# Patient Record
Sex: FEMALE | Race: BLACK OR AFRICAN AMERICAN | Employment: FULL TIME | URBAN - METROPOLITAN AREA
[De-identification: names, ages, dates, MRNs, and addresses within clinical notes are randomized per-mention and may not be internally consistent; named-entity substitution may affect disease eponyms.]

---

## 2018-04-06 NOTE — PERIOP NOTES
PAT PREOP PHONE INTERVIEW COMPLETED WITH:  Michelle Manriquez, PATIENT. PATIENT VERBALIZED KNOWLEDGE OF PREOP BOWEL PREP. PATIENT ADVISED NOT TO EAT/DRINK ANYTHING PAST MIDNIGHT EVENING PRIOR TO SURGERY,  NOTHING TO EAT/DRINK MORNING OF SURGERY UNLESS SIP OF WATER TO SWALLOW MEDICATION;  LEAVE ALL VALUABLES AT HOME; DO BRING PICTURE ID, INSURANCE CARD AND ANY COPAY; WEAR COMFORTABLE CLOTHING;  NO PERFUMES, POWDERS, LOTIONS; NO ALCOHOL 24 HOURS BEFORE OR AFTER SURGERY;  WILL NEED TO BE DRIVEN HOME BY FAMILY OR FRIEND;  AVOID TAKING NSAIDS, ASPIRIN, FISH OIL, VITAMIN E OR GLUCOSAMINE/CHONDROITIN DURING THIS TIME PRIOR TO SURGERY;  MAY TAKE TYLENOL. INSTRUCTED TO REPORT  [address] BY SURGEON'S OFFICE.

## 2018-04-10 ENCOUNTER — ANESTHESIA EVENT (OUTPATIENT)
Dept: SURGERY | Age: 38
End: 2018-04-10
Payer: COMMERCIAL

## 2018-04-10 ENCOUNTER — ANESTHESIA (OUTPATIENT)
Dept: SURGERY | Age: 38
End: 2018-04-10
Payer: COMMERCIAL

## 2018-04-10 ENCOUNTER — HOSPITAL ENCOUNTER (OUTPATIENT)
Age: 38
Setting detail: OUTPATIENT SURGERY
Discharge: HOME | End: 2018-04-10
Attending: SPECIALIST | Admitting: SPECIALIST
Payer: COMMERCIAL

## 2018-04-10 VITALS
DIASTOLIC BLOOD PRESSURE: 77 MMHG | HEART RATE: 68 BPM | BODY MASS INDEX: 33.66 KG/M2 | OXYGEN SATURATION: 96 % | HEIGHT: 63 IN | RESPIRATION RATE: 14 BRPM | TEMPERATURE: 97.8 F | SYSTOLIC BLOOD PRESSURE: 113 MMHG | WEIGHT: 190 LBS

## 2018-04-10 DIAGNOSIS — N80.9 ENDOMETRIOSIS: Primary | ICD-10-CM

## 2018-04-10 LAB
HCG UR QL: NEGATIVE
HGB BLD-MCNC: 12 G/DL (ref 11.5–16)

## 2018-04-10 PROCEDURE — 77030018316 HC SEAL FBRN TISSL 4ML BAXT -C: Performed by: SPECIALIST

## 2018-04-10 PROCEDURE — 77030008756 HC TU IRR SUC STRY -B: Performed by: SPECIALIST

## 2018-04-10 PROCEDURE — 77030018836 HC SOL IRR NACL ICUM -A: Performed by: SPECIALIST

## 2018-04-10 PROCEDURE — 76060000032 HC ANESTHESIA 0.5 TO 1 HR: Performed by: SPECIALIST

## 2018-04-10 PROCEDURE — 77030008684 HC TU ET CUF COVD -B: Performed by: ANESTHESIOLOGY

## 2018-04-10 PROCEDURE — 74011250637 HC RX REV CODE- 250/637: Performed by: ANESTHESIOLOGY

## 2018-04-10 PROCEDURE — 81025 URINE PREGNANCY TEST: CPT

## 2018-04-10 PROCEDURE — 77030010507 HC ADH SKN DERMBND J&J -B: Performed by: SPECIALIST

## 2018-04-10 PROCEDURE — 77030009852 HC PCH RTVR ENDOSC COVD -B: Performed by: SPECIALIST

## 2018-04-10 PROCEDURE — 77030032490 HC SLV COMPR SCD KNE COVD -B: Performed by: SPECIALIST

## 2018-04-10 PROCEDURE — 77030005518 HC CATH URETH FOL 2W BARD -B: Performed by: SPECIALIST

## 2018-04-10 PROCEDURE — 74011000250 HC RX REV CODE- 250: Performed by: SPECIALIST

## 2018-04-10 PROCEDURE — 77030027743 HC APPL F/HEMSTAT BARD -B: Performed by: SPECIALIST

## 2018-04-10 PROCEDURE — 88305 TISSUE EXAM BY PATHOLOGIST: CPT | Performed by: SPECIALIST

## 2018-04-10 PROCEDURE — 76010000933 HC OR TIME 0.5 TO 1HR INTENSV - TIER 2: Performed by: SPECIALIST

## 2018-04-10 PROCEDURE — 77030020703 HC SEAL CANN DISP INTU -B: Performed by: SPECIALIST

## 2018-04-10 PROCEDURE — 77030026438 HC STYL ET INTUB CARD -A: Performed by: ANESTHESIOLOGY

## 2018-04-10 PROCEDURE — 74011250636 HC RX REV CODE- 250/636

## 2018-04-10 PROCEDURE — 74011250636 HC RX REV CODE- 250/636: Performed by: SPECIALIST

## 2018-04-10 PROCEDURE — 77030016151 HC PROTCTR LNS DFOG COVD -B: Performed by: SPECIALIST

## 2018-04-10 PROCEDURE — 77030013079 HC BLNKT BAIR HGGR 3M -A: Performed by: ANESTHESIOLOGY

## 2018-04-10 PROCEDURE — 85018 HEMOGLOBIN: CPT

## 2018-04-10 PROCEDURE — 77030035277 HC OBTRTR BLDELSS DISP INTU -B: Performed by: SPECIALIST

## 2018-04-10 PROCEDURE — 77030035045 HC TRCR ENDOSC VRSPRT BLDLSS COVD -B: Performed by: SPECIALIST

## 2018-04-10 PROCEDURE — 77030031139 HC SUT VCRL2 J&J -A: Performed by: SPECIALIST

## 2018-04-10 PROCEDURE — 74011250636 HC RX REV CODE- 250/636: Performed by: ANESTHESIOLOGY

## 2018-04-10 PROCEDURE — 77030035048 HC TRCR ENDOSC OPTCL COVD -B: Performed by: SPECIALIST

## 2018-04-10 PROCEDURE — 77030002933 HC SUT MCRYL J&J -A: Performed by: SPECIALIST

## 2018-04-10 PROCEDURE — 88307 TISSUE EXAM BY PATHOLOGIST: CPT | Performed by: SPECIALIST

## 2018-04-10 PROCEDURE — 77030032060 HC PWDR HEMSTAT ARISTA ASRB 3GM BARD -C: Performed by: SPECIALIST

## 2018-04-10 PROCEDURE — 76210000021 HC REC RM PH II 0.5 TO 1 HR: Performed by: SPECIALIST

## 2018-04-10 PROCEDURE — 76210000016 HC OR PH I REC 1 TO 1.5 HR: Performed by: SPECIALIST

## 2018-04-10 PROCEDURE — 77030035051: Performed by: SPECIALIST

## 2018-04-10 PROCEDURE — 74011000250 HC RX REV CODE- 250

## 2018-04-10 PROCEDURE — 77030020782 HC GWN BAIR PAWS FLX 3M -B

## 2018-04-10 PROCEDURE — C1765 ADHESION BARRIER: HCPCS | Performed by: SPECIALIST

## 2018-04-10 PROCEDURE — 77030003580 HC NDL INSUF VERES J&J -B: Performed by: SPECIALIST

## 2018-04-10 RX ORDER — HYDROMORPHONE HYDROCHLORIDE 2 MG/ML
INJECTION, SOLUTION INTRAMUSCULAR; INTRAVENOUS; SUBCUTANEOUS AS NEEDED
Status: DISCONTINUED | OUTPATIENT
Start: 2018-04-10 | End: 2018-04-10 | Stop reason: HOSPADM

## 2018-04-10 RX ORDER — FENTANYL CITRATE 50 UG/ML
25 INJECTION, SOLUTION INTRAMUSCULAR; INTRAVENOUS
Status: DISCONTINUED | OUTPATIENT
Start: 2018-04-10 | End: 2018-04-10 | Stop reason: HOSPADM

## 2018-04-10 RX ORDER — SODIUM CHLORIDE 9 MG/ML
50 INJECTION, SOLUTION INTRAVENOUS CONTINUOUS
Status: DISCONTINUED | OUTPATIENT
Start: 2018-04-10 | End: 2018-04-10 | Stop reason: HOSPADM

## 2018-04-10 RX ORDER — SODIUM CHLORIDE 0.9 % (FLUSH) 0.9 %
5-10 SYRINGE (ML) INJECTION AS NEEDED
Status: DISCONTINUED | OUTPATIENT
Start: 2018-04-10 | End: 2018-04-10 | Stop reason: HOSPADM

## 2018-04-10 RX ORDER — MIDAZOLAM HYDROCHLORIDE 1 MG/ML
1 INJECTION, SOLUTION INTRAMUSCULAR; INTRAVENOUS AS NEEDED
Status: DISCONTINUED | OUTPATIENT
Start: 2018-04-10 | End: 2018-04-10 | Stop reason: HOSPADM

## 2018-04-10 RX ORDER — BUPIVACAINE HYDROCHLORIDE 5 MG/ML
INJECTION, SOLUTION EPIDURAL; INTRACAUDAL AS NEEDED
Status: DISCONTINUED | OUTPATIENT
Start: 2018-04-10 | End: 2018-04-10 | Stop reason: HOSPADM

## 2018-04-10 RX ORDER — DIPHENHYDRAMINE HYDROCHLORIDE 50 MG/ML
12.5 INJECTION, SOLUTION INTRAMUSCULAR; INTRAVENOUS AS NEEDED
Status: DISCONTINUED | OUTPATIENT
Start: 2018-04-10 | End: 2018-04-10 | Stop reason: HOSPADM

## 2018-04-10 RX ORDER — GLYCOPYRROLATE 0.2 MG/ML
INJECTION INTRAMUSCULAR; INTRAVENOUS AS NEEDED
Status: DISCONTINUED | OUTPATIENT
Start: 2018-04-10 | End: 2018-04-10 | Stop reason: HOSPADM

## 2018-04-10 RX ORDER — MORPHINE SULFATE 10 MG/ML
2 INJECTION, SOLUTION INTRAMUSCULAR; INTRAVENOUS
Status: DISCONTINUED | OUTPATIENT
Start: 2018-04-10 | End: 2018-04-10 | Stop reason: HOSPADM

## 2018-04-10 RX ORDER — LIDOCAINE HYDROCHLORIDE 20 MG/ML
INJECTION, SOLUTION EPIDURAL; INFILTRATION; INTRACAUDAL; PERINEURAL AS NEEDED
Status: DISCONTINUED | OUTPATIENT
Start: 2018-04-10 | End: 2018-04-10 | Stop reason: HOSPADM

## 2018-04-10 RX ORDER — SODIUM CHLORIDE, SODIUM LACTATE, POTASSIUM CHLORIDE, CALCIUM CHLORIDE 600; 310; 30; 20 MG/100ML; MG/100ML; MG/100ML; MG/100ML
125 INJECTION, SOLUTION INTRAVENOUS CONTINUOUS
Status: DISCONTINUED | OUTPATIENT
Start: 2018-04-10 | End: 2018-04-10 | Stop reason: HOSPADM

## 2018-04-10 RX ORDER — ENOXAPARIN SODIUM 100 MG/ML
40 INJECTION SUBCUTANEOUS ONCE
Status: COMPLETED | OUTPATIENT
Start: 2018-04-10 | End: 2018-04-10

## 2018-04-10 RX ORDER — SODIUM CHLORIDE, SODIUM LACTATE, POTASSIUM CHLORIDE, CALCIUM CHLORIDE 600; 310; 30; 20 MG/100ML; MG/100ML; MG/100ML; MG/100ML
INJECTION, SOLUTION INTRAVENOUS
Status: DISCONTINUED | OUTPATIENT
Start: 2018-04-10 | End: 2018-04-10 | Stop reason: HOSPADM

## 2018-04-10 RX ORDER — DEXAMETHASONE SODIUM PHOSPHATE 4 MG/ML
INJECTION, SOLUTION INTRA-ARTICULAR; INTRALESIONAL; INTRAMUSCULAR; INTRAVENOUS; SOFT TISSUE AS NEEDED
Status: DISCONTINUED | OUTPATIENT
Start: 2018-04-10 | End: 2018-04-10 | Stop reason: HOSPADM

## 2018-04-10 RX ORDER — PROPOFOL 10 MG/ML
INJECTION, EMULSION INTRAVENOUS AS NEEDED
Status: DISCONTINUED | OUTPATIENT
Start: 2018-04-10 | End: 2018-04-10 | Stop reason: HOSPADM

## 2018-04-10 RX ORDER — OXYCODONE AND ACETAMINOPHEN 5; 325 MG/1; MG/1
1 TABLET ORAL AS NEEDED
Status: DISCONTINUED | OUTPATIENT
Start: 2018-04-10 | End: 2018-04-10 | Stop reason: HOSPADM

## 2018-04-10 RX ORDER — ONDANSETRON 2 MG/ML
INJECTION INTRAMUSCULAR; INTRAVENOUS AS NEEDED
Status: DISCONTINUED | OUTPATIENT
Start: 2018-04-10 | End: 2018-04-10 | Stop reason: HOSPADM

## 2018-04-10 RX ORDER — SODIUM CHLORIDE 0.9 % (FLUSH) 0.9 %
5-10 SYRINGE (ML) INJECTION EVERY 8 HOURS
Status: DISCONTINUED | OUTPATIENT
Start: 2018-04-10 | End: 2018-04-10 | Stop reason: HOSPADM

## 2018-04-10 RX ORDER — ONDANSETRON 2 MG/ML
4 INJECTION INTRAMUSCULAR; INTRAVENOUS AS NEEDED
Status: DISCONTINUED | OUTPATIENT
Start: 2018-04-10 | End: 2018-04-10 | Stop reason: HOSPADM

## 2018-04-10 RX ORDER — SODIUM CHLORIDE 9 MG/ML
1000 INJECTION, SOLUTION INTRAVENOUS CONTINUOUS
Status: DISCONTINUED | OUTPATIENT
Start: 2018-04-10 | End: 2018-04-10 | Stop reason: HOSPADM

## 2018-04-10 RX ORDER — LIDOCAINE HYDROCHLORIDE 10 MG/ML
0.1 INJECTION, SOLUTION EPIDURAL; INFILTRATION; INTRACAUDAL; PERINEURAL AS NEEDED
Status: DISCONTINUED | OUTPATIENT
Start: 2018-04-10 | End: 2018-04-10 | Stop reason: HOSPADM

## 2018-04-10 RX ORDER — KETOROLAC TROMETHAMINE 30 MG/ML
INJECTION, SOLUTION INTRAMUSCULAR; INTRAVENOUS AS NEEDED
Status: DISCONTINUED | OUTPATIENT
Start: 2018-04-10 | End: 2018-04-10 | Stop reason: HOSPADM

## 2018-04-10 RX ORDER — FENTANYL CITRATE 50 UG/ML
50 INJECTION, SOLUTION INTRAMUSCULAR; INTRAVENOUS AS NEEDED
Status: DISCONTINUED | OUTPATIENT
Start: 2018-04-10 | End: 2018-04-10 | Stop reason: HOSPADM

## 2018-04-10 RX ORDER — SUCCINYLCHOLINE CHLORIDE 20 MG/ML
INJECTION INTRAMUSCULAR; INTRAVENOUS AS NEEDED
Status: DISCONTINUED | OUTPATIENT
Start: 2018-04-10 | End: 2018-04-10 | Stop reason: HOSPADM

## 2018-04-10 RX ORDER — CEFAZOLIN SODIUM/WATER 2 G/20 ML
2 SYRINGE (ML) INTRAVENOUS ONCE
Status: COMPLETED | OUTPATIENT
Start: 2018-04-10 | End: 2018-04-10

## 2018-04-10 RX ORDER — HYDROCODONE BITARTRATE AND ACETAMINOPHEN 5; 325 MG/1; MG/1
1 TABLET ORAL
Qty: 30 TAB | Refills: 0 | Status: SHIPPED | OUTPATIENT
Start: 2018-04-10 | End: 2022-04-15

## 2018-04-10 RX ORDER — FENTANYL CITRATE 50 UG/ML
INJECTION, SOLUTION INTRAMUSCULAR; INTRAVENOUS AS NEEDED
Status: DISCONTINUED | OUTPATIENT
Start: 2018-04-10 | End: 2018-04-10 | Stop reason: HOSPADM

## 2018-04-10 RX ORDER — MIDAZOLAM HYDROCHLORIDE 1 MG/ML
INJECTION, SOLUTION INTRAMUSCULAR; INTRAVENOUS AS NEEDED
Status: DISCONTINUED | OUTPATIENT
Start: 2018-04-10 | End: 2018-04-10 | Stop reason: HOSPADM

## 2018-04-10 RX ORDER — NEOSTIGMINE METHYLSULFATE 1 MG/ML
INJECTION INTRAVENOUS AS NEEDED
Status: DISCONTINUED | OUTPATIENT
Start: 2018-04-10 | End: 2018-04-10 | Stop reason: HOSPADM

## 2018-04-10 RX ORDER — MIDAZOLAM HYDROCHLORIDE 1 MG/ML
0.5 INJECTION, SOLUTION INTRAMUSCULAR; INTRAVENOUS
Status: DISCONTINUED | OUTPATIENT
Start: 2018-04-10 | End: 2018-04-10 | Stop reason: HOSPADM

## 2018-04-10 RX ORDER — ROCURONIUM BROMIDE 10 MG/ML
INJECTION, SOLUTION INTRAVENOUS AS NEEDED
Status: DISCONTINUED | OUTPATIENT
Start: 2018-04-10 | End: 2018-04-10 | Stop reason: HOSPADM

## 2018-04-10 RX ADMIN — FENTANYL CITRATE 50 MCG: 50 INJECTION, SOLUTION INTRAMUSCULAR; INTRAVENOUS at 07:34

## 2018-04-10 RX ADMIN — KETOROLAC TROMETHAMINE 30 MG: 30 INJECTION, SOLUTION INTRAMUSCULAR; INTRAVENOUS at 08:40

## 2018-04-10 RX ADMIN — Medication 2 G: at 07:35

## 2018-04-10 RX ADMIN — PROPOFOL 50 MG: 10 INJECTION, EMULSION INTRAVENOUS at 07:34

## 2018-04-10 RX ADMIN — OXYCODONE HYDROCHLORIDE AND ACETAMINOPHEN 1 TABLET: 5; 325 TABLET ORAL at 10:13

## 2018-04-10 RX ADMIN — ENOXAPARIN SODIUM 40 MG: 40 INJECTION SUBCUTANEOUS at 07:16

## 2018-04-10 RX ADMIN — SODIUM CHLORIDE, SODIUM LACTATE, POTASSIUM CHLORIDE, AND CALCIUM CHLORIDE 125 ML/HR: 600; 310; 30; 20 INJECTION, SOLUTION INTRAVENOUS at 07:14

## 2018-04-10 RX ADMIN — PROPOFOL 150 MG: 10 INJECTION, EMULSION INTRAVENOUS at 07:30

## 2018-04-10 RX ADMIN — ONDANSETRON 4 MG: 2 INJECTION INTRAMUSCULAR; INTRAVENOUS at 08:39

## 2018-04-10 RX ADMIN — FENTANYL CITRATE 25 MCG: 50 INJECTION, SOLUTION INTRAMUSCULAR; INTRAVENOUS at 09:20

## 2018-04-10 RX ADMIN — ONDANSETRON 4 MG: 2 INJECTION INTRAMUSCULAR; INTRAVENOUS at 09:55

## 2018-04-10 RX ADMIN — HYDROMORPHONE HYDROCHLORIDE 0.5 MG: 2 INJECTION, SOLUTION INTRAMUSCULAR; INTRAVENOUS; SUBCUTANEOUS at 07:51

## 2018-04-10 RX ADMIN — FENTANYL CITRATE 25 MCG: 50 INJECTION, SOLUTION INTRAMUSCULAR; INTRAVENOUS at 09:47

## 2018-04-10 RX ADMIN — DEXAMETHASONE SODIUM PHOSPHATE 8 MG: 4 INJECTION, SOLUTION INTRA-ARTICULAR; INTRALESIONAL; INTRAMUSCULAR; INTRAVENOUS; SOFT TISSUE at 07:42

## 2018-04-10 RX ADMIN — NEOSTIGMINE METHYLSULFATE 3 MG: 1 INJECTION INTRAVENOUS at 08:39

## 2018-04-10 RX ADMIN — SODIUM CHLORIDE, SODIUM LACTATE, POTASSIUM CHLORIDE, CALCIUM CHLORIDE: 600; 310; 30; 20 INJECTION, SOLUTION INTRAVENOUS at 07:26

## 2018-04-10 RX ADMIN — SUCCINYLCHOLINE CHLORIDE 140 MG: 20 INJECTION INTRAMUSCULAR; INTRAVENOUS at 07:30

## 2018-04-10 RX ADMIN — FENTANYL CITRATE 50 MCG: 50 INJECTION, SOLUTION INTRAMUSCULAR; INTRAVENOUS at 07:30

## 2018-04-10 RX ADMIN — ROCURONIUM BROMIDE 5 MG: 10 INJECTION, SOLUTION INTRAVENOUS at 07:30

## 2018-04-10 RX ADMIN — ROCURONIUM BROMIDE 20 MG: 10 INJECTION, SOLUTION INTRAVENOUS at 07:39

## 2018-04-10 RX ADMIN — GLYCOPYRROLATE 0.5 MG: 0.2 INJECTION INTRAMUSCULAR; INTRAVENOUS at 08:39

## 2018-04-10 RX ADMIN — FENTANYL CITRATE 25 MCG: 50 INJECTION, SOLUTION INTRAMUSCULAR; INTRAVENOUS at 09:25

## 2018-04-10 RX ADMIN — LIDOCAINE HYDROCHLORIDE 100 MG: 20 INJECTION, SOLUTION EPIDURAL; INFILTRATION; INTRACAUDAL; PERINEURAL at 07:30

## 2018-04-10 RX ADMIN — MIDAZOLAM HYDROCHLORIDE 2 MG: 1 INJECTION, SOLUTION INTRAMUSCULAR; INTRAVENOUS at 07:26

## 2018-04-10 NOTE — OP NOTES
[address]  ACUTE CARE OP NOTE    Rmima Zuñiga  MR#: [id number]  : 1980  ACCOUNT #: [de-identified]   DATE OF SERVICE: 04/10/2018    PREOPERATIVE DIAGNOSES:  1.  Pelvic pain. 2.  Irregular menstrual cycle. 3.  Endometriosis. 4.  Ovarian cyst.    POSTOPERATIVE DIAGNOSES:  1.  Pelvic pain. 2.  Irregular menstrual cycle. 3.  Endometriosis. 4.  Ovarian cyst.    PROCEDURES PERFORMED:  1.  Da Magali diagnostic laparoscopy. 2.  Extensive lysis of adhesions. 3.  Bilateral ovarian cystectomy. SURGEON:  Yajaira Dixon MD     ASSISTANT:       ANESTHESIA:  General.    FINDINGS:    1. Left simple cyst, approximately 10 x 7 cm. 2.  Right endometrioma, approximately 3 x 4 cm. 3.  Left dermoid, approximately 4 x 5 cm. 4.  Normal bilateral fallopian tubes. 5.  Normal-sized uterus. SPECIMENS REMOVED:    1. Dermoid. 2.  Endometrial cyst wall. ESTIMATED BLOOD LOSS:  Minimal.    FLUID:  Within the simple ovarian cyst 400 mL of clear fluid. COMPLICATIONS:  None. DRAINS:  Martins catheter, clear urine. IMPLANTS:       DESCRIPTION OF PROCEDURE:  After extensive counseling of risks and benefits of the procedure, complication rates of the procedure, alternatives to procedure and consent being signed, she was taken to the operating room. After adequate anesthesia, she was placed in the dorsal lithotomy position, prepped and draped in the usual sterile manner for the surgical procedure. Martins catheter was in place, clear urine was noted. Sterile Graves speculum was inserted in the vagina. The anterior lip of the cervix was grasped with a single-tooth tenaculum, and the cervix was gently dilated to accept a ClearView uterine manipulator.   The single-tooth tenaculum and Graves speculum were removed, and attention was turned to the abdomen where a Veress needle was placed infraumbilically and confirmed with a water drop test.  Insufflation of CO2 up to 15 mmHg was then performed without complication. A #11 scalpel blade was used to make an infraumbilical incision, 8 mm bladeless trocar and sleeve was inserted infraumbilically with the above-noted findings. Then, 10 cm bilateral to the umbilical port site, 8 mm bladeless trocars were inserted, as well 1 in the right lower quadrant port site. The patient was placed in Trendelenburg, the [employer] robot was docked under the proper docking technique. Fenestrated bipolar in left hand, hot ana in the right hand. Under careful inspection with hydrodissection, extensive adhesiolysis for approximately 30 minutes was performed, with the sigmoid colon adherent to the posterior surface of the uterus. The above-noted findings were noted. A left ovarian cystectomy was performed, the fluid was suctioned from the ovarian cyst of approximately 400+ clear fluid. Careful inspection revealed a left dermoid cyst, approximately 3 x 4 cm, which left ovarian cystectomy was performed. The dermoid was placed in an Endobag and removed through the right lower quadrant port site. There was also on the right side endometrioma x2, which cystectomy was performed, irrigation was performed, the fluid was clear. In light of the patient's desire to maintain both ovaries, bilateral ovarian cystectomies were performed without complication. Upon completion, there was no active bleeding noted. Irrigation was performed again, clear fluid was suctioned out. Tisseel was sprayed over the ovarian cystectomy sites, as well as Carolina. Upon completion, no active bleeding was noted. The [employer] robot was undocked under the proper undocking technique. The 8 mm sites were closed with 3-0 Vicryl interrupted x1. Dermabond was used on the skin. Marcaine 0.25% without was injected subQ. The patient tolerated the procedure well. The needle, sponge and instrument count were correct x2 at the end of the procedure.   She was awakened in the operating room and taken to recovery in stable condition.       MD JIMBO Seymour / MERRY  D: 04/10/2018 08:44     T: 04/10/2018 09:08  JOB #: [id number]

## 2018-04-10 NOTE — PROGRESS NOTES
04/10/18 0806   Family Communication   Family Update Message Other (Comment)  (no answer)   Delivery Origin Nurse  Monty Negron)    Relationship to Patient Parent  (\"Hanh\")    Phone Number [phone]  (no check in at surgical waiting per volunteer)   Family/Significant Other Update Called  (no answer)

## 2018-04-10 NOTE — DISCHARGE INSTRUCTIONS
No driving for 48 hours or while taking narcotic pain medications. No heavy lifting. Limit lifting to 10 pounds. (A gallon of milk is about 8 pounds)    Nothing in the vagina for 2 weeks. Please follow up with Dr. Nitza Bellamy in 1 week. Please call his office for an appointment.    ______________________________________________________________________    Anesthesia Discharge Instructions    After general anesthesia or intervenous sedation, for 24 hours or while taking prescription Narcotics:  · Limit your activities  · Do not drive or operate hazardous machinery  · If you have not urinated within 8 hours after discharge, please contact your surgeon on call. · Do not make important personal or business decisions  · Do not drink alcoholic beverages    Report the following to your surgeon:  · Excessive pain, swelling, redness or odor of or around the surgical area  · Temperature over 100.5 degrees  · Nausea and vomiting lasting longer than 4 hours or if unable to take medication  · Any signs of decreased circulation or nerve impairment to extremity:  Change in color, persistent numbness, tingling, coldness or increased pain. · Any questions         Pelvic Laparoscopy: What to Expect at [address] [place] [address]    After surgery, it is normal to have a sore belly, cramping, or pain around the cuts the doctor made (incisions) for up to 4 days. You can expect to feel better and stronger each day, although you may get tired quickly and need pain medicine for a few days. Some people are able to return to work the day after surgery, while others need to recover for a few days to a few weeks before going back to work. Sometimes pressure from the gas used during surgery causes other side effects. You may have pain in your neck or shoulders. Or you may feel pressure on your bladder and need to urinate more often than usual. These side effects should go away in less than 4 days.   Do not lift anything heavy while you are recovering so that your incisions can heal.  This care sheet gives you a general idea about how long it will take for you to recover. But each person recovers at a different pace. Follow the steps below to get better as quickly as possible. How can you care for yourself at home? Activity  ? · Rest when you feel tired. Getting enough sleep will help you recover. ? · Try to walk each day. Start out by walking a little more than you did the day before. Bit by bit, increase the amount you walk. Walking boosts blood flow and helps prevent pneumonia and constipation. ? · For 1 week, avoid lifting anything that would make you strain. This may include a child, heavy grocery bags and milk containers, a heavy briefcase or backpack, cat litter or dog food bags, or a vacuum . ? · Avoid strenuous activities, such as biking, jogging, weight lifting, and aerobic exercise, for 1 week. ? · You may shower. Pat the incisions dry when you are done. Do not take a bath for the first week after surgery or until your doctor tells you it is okay. ? · You may have some light vaginal bleeding. Wear sanitary pads if needed. Do not douche or use tampons. ? · You may drive when you are no longer taking prescription pain medicine and can quickly move your foot from the gas pedal to the brake. You must also be able to sit comfortably for a long period of time, even if you do not plan to go far. You might get caught in traffic. ? · You may need to take a few days to a few weeks off work. It depends on the type of work you do and how you feel. ? · Do not have sex until your doctor tells you it is okay. Diet  ? · You can eat your normal diet. If your stomach is upset, try bland, low-fat foods like plain rice, broiled chicken, toast, and yogurt. ? · Drink plenty of fluids (unless your doctor tells you not to). ? · You may notice that your bowel movements are not regular right after your surgery. This is common.  Try to avoid constipation and straining with bowel movements. You may want to take a fiber supplement every day. If you have not had a bowel movement after a couple of days, ask your doctor about taking a mild laxative. Medicines  ? · Your doctor will tell you if and when you can restart your medicines. He or she will also give you instructions about taking any new medicines. ? · If you take blood thinners, such as warfarin (Coumadin), clopidogrel (Plavix), or aspirin, be sure to talk to your doctor. He or she will tell you if and when to start taking those medicines again. Make sure that you understand exactly what your doctor wants you to do. ? · Be safe with medicines. Take pain medicines exactly as directed. ¨ If the doctor gave you a prescription medicine for pain, take it as prescribed. ¨ If you are not taking a prescription pain medicine, take an over-the-counter medicine such as acetaminophen (Tylenol), ibuprofen (Advil, Motrin), or naproxen (Aleve). Read and follow all instructions on the label. ¨ Do not take two or more pain medicines at the same time unless the doctor told you to. Many pain medicines contain acetaminophen, which is Tylenol. Too much acetaminophen (Tylenol) can be harmful. ? · If you think your pain medicine is making you sick to your stomach:  ¨ Take your medicine after meals (unless your doctor tells you not to). ¨ Ask your doctor for a different pain medicine. ? · If your doctor prescribed antibiotics, take them as directed. Do not stop taking them just because you feel better. You need to take the full course of antibiotics. Incision care  ? · If you have strips of tape on the incisions, leave the tape on for a week or until it falls off.   ? · Wash the area daily with warm, soapy water and pat it dry. ? · Keep the area clean and dry. You may cover it with a gauze bandage if it weeps or rubs against clothing. Change the bandage every day. Other instructions  ?  · Wear loose, comfortable clothing, and avoid anything that puts pressure on your belly, such as a girdle, for a few weeks. ? · You may want to use a heating pad on your belly to help with pain. Follow-up care is a key part of your treatment and safety. Be sure to make and go to all appointments, and call your doctor if you are having problems. It's also a good idea to know your test results and keep a list of the medicines you take. When should you call for help? Call 911 anytime you think you may need emergency care. For example, call if:  ? · You passed out (lost consciousness). ? · You have chest pain, are short of breath, or cough up blood. ?Call your doctor now or seek immediate medical care if:  ? · You have bright red vaginal bleeding that soaks one or more pads in an hour, or you have large clots. ? · You are sick to your stomach or cannot drink fluids. ? · You have vaginal discharge that has increased in amount or smells bad.   ? · You have pain that does not get better after you take pain medicine. ? · You have signs of infection, such as:  ¨ Increased pain, swelling, warmth, or redness. ¨ Red streaks leading from the incision. ¨ Pus draining from the incision. ¨ A fever. ? · You have loose stitches, or your incisions come open. ? · Bright red blood has soaked through the bandages over your incisions. ? · You have signs of a blood clot in your leg (called a deep vein thrombosis), such as:  ¨ Pain in your calf, back of the knee, thigh, or groin. ¨ Redness and swelling in your leg. ? · You cannot pass stools or gas. ? Watch closely for changes in your health, and be sure to contact your doctor if you have any problems. Where can you learn more? Go to http://jim-terri.info/. Enter N679 in the search box to learn more about \"Pelvic Laparoscopy: What to Expect at Home. \"  Current as of: October 13, 2016  Content Version: 11.4  © 1108-9083 [employer], [employer]. Care instructions adapted under license by [employer] (which disclaims liability or warranty for this information). If you have questions about a medical condition or this instruction, always ask your healthcare professional. [address] any warranty or liability for your use of this information.

## 2018-04-10 NOTE — H&P
Gynecology History and Physical    Name: Vinnie Young MRN: [id number] SSN: [id number]    YOB: 1980  Age: 41 y.o. Sex: female       Subjective:      Chief complaint:  Ovarian cyst     Yuly Gonzalez is a 41 y.o. female with a history of ovarian cysts and significant constipation. The patient denies pain. Previous treatment measures have not helped to improve the patient's symptoms. She is admitted for Procedure(s) (LRB):  DAVINCI DIAGNOSTIC LAPAROSCOPY POSSIBLE BILATERAL CYSTECTOMY POSSIBLE BILATERAL OOPHORECTOMY  (N/A). OB History     Obstetric Comments    Menarche:  9. LMP: 2/24/14. # of Children:  1. Age at Delivery of First Child:  30.   Hysterectomy/oophorectomy:  NO/NO. Breast Bx:  no.  Hx of Breast Feeding:  yes. BCP:  yes. Hormone therapy:  no.        Past Medical History:   Diagnosis Date    Allergic rhinitis     Endometriosis      Past Surgical History:   Procedure Laterality Date    HX ABDOMINAL LAPAROSCOPY      HX GYN      endometriosis, LAP.    HX HEENT      WISDOM TEETH, ORAL SURGERY. Social History     Occupational History    Not on file. Social History Main Topics    Smoking status: Never Smoker    Smokeless tobacco: Never Used      Comment: TRIED SMOKING YEARS AGO, TEENAGER.  Alcohol use 0.0 oz/week     0 Standard drinks or equivalent per week      Comment: Occasionally    Drug use: No    Sexual activity: Yes     Partners: Male     Birth control/ protection: Inserts     Family History   Problem Relation Age of Onset    Cancer Other      breast cancer, 42's    Hypertension Mother     Cancer Maternal Grandfather 67     Prostate, colon    Anesth Problems Neg Hx         No Known Allergies  Prior to Admission medications    Medication Sig Start Date End Date Taking? Authorizing Provider   OTHER daily. MORINGA NATURAL SUPPLIMENT ([place])   Yes Historical Provider   BLUE-GREEN ALGAE (SPIRULINA) by Does Not Apply route daily.    Yes Historical Provider cetirizine (ZYRTEC) 10 mg tablet Take 10 mg by mouth daily. Historical Provider   ethinyl estradiol-etonogestrel (NUVARING) 0.12-0.015 mg/24 hr vaginal ring by Intravaginally route. Historical Provider        Review of Systems:  Pertinent items are noted in the History of Present Illness. Objective:     Vitals:    04/06/18 0957 04/10/18 0643   BP:  112/87   Pulse:  79   Temp:  98.7 °F (37.1 °C)   SpO2:  100%   Weight: 84.4 kg (186 lb) 86.2 kg (190 lb)   Height: 5' 3\" (1.6 m) 5' 3\" (1.6 m)       Physical Exam:  Patient without distress. Heart: Regular rate and rhythm, S1S2 present or without murmur or extra heart sounds  Lung: clear to auscultation throughout lung fields, no wheezes, no rales, no rhonchi and normal respiratory effort  Abdomen: soft, nontender    Assessment:     Active Problems:    * No active hospital problems. *     Ovarian cysts and constipation    Plan:     Procedure(s) (LRB):  DAVINCI DIAGNOSTIC LAPAROSCOPY POSSIBLE BILATERAL CYSTECTOMY POSSIBLE BILATERAL OOPHORECTOMY  (N/A)  Discussed the risks of surgery including the risks of bleeding, infection, deep vein thrombosis, and surgical injuries to internal organs including but not limited to the bowels, bladder, rectum, and female reproductive organs. The patient understands the risks; any and all questions were answered to the patient's satisfaction.     Signed By:  Manpreet Drake PA-C     April 10, 2018

## 2018-04-10 NOTE — ANESTHESIA POSTPROCEDURE EVALUATION
Post-Anesthesia Evaluation and Assessment    Patient: Deborah White MRN: [id number]  SSN: [id number]    YOB: 1980  Age: 41 y.o. Sex: female       Cardiovascular Function/Vital Signs  Visit Vitals    /85    Pulse 65    Temp 36.7 °C (98.1 °F)    Resp 12    Ht 5' 3\" (1.6 m)    Wt 86.2 kg (190 lb)    SpO2 96%    BMI 33.66 kg/m2       Patient is status post general anesthesia for Procedure(s):  DAVINCI DIAGNOSTIC LAPAROSCOPY, LYSIS OF ADHESIONS, BILATERAL OVARIAN CYSTECTOMY. Nausea/Vomiting: None    Postoperative hydration reviewed and adequate. Pain:  Pain Scale 1: Numeric (0 - 10) (04/10/18 0948)  Pain Intensity 1: 5 (04/10/18 0948)   Managed    Neurological Status:   Neuro (WDL): Exceptions to [facility] (04/10/18 7280)  Neuro  Neurologic State: Drowsy; Anesthetized (04/10/18 0903)   At baseline    Mental Status and Level of Consciousness: Arousable    Pulmonary Status:   O2 Device: Room air (04/10/18 0958)   Adequate oxygenation and airway patent    Complications related to anesthesia: None    Post-anesthesia assessment completed.  No concerns    Signed By: Ivy Faust MD     April 10, 2018

## 2018-04-10 NOTE — PERIOP NOTES
6079: Pre-op pregnancy test negative confirmed by Praveena Rosenthal (CRNA confirmed with pre-op RN, not yet available in electronic record)

## 2018-04-10 NOTE — BRIEF OP NOTE
BRIEF OPERATIVE NOTE    Date of Procedure: 4/10/2018   Preoperative Diagnosis: PELVIC PAIN, IRREGULAR MENSTRATION, ENDOMETRIOSIS, OVARIAN CYST  Postoperative Diagnosis: PELVIC PAIN, IRREGULAR MENSTRATION, ENDOMETRIOSIS, OVARIAN SIMPLE CYST, DERMOID CYST, ENDOMETRIOMA    Procedure(s):  DAVINCI DIAGNOSTIC LAPAROSCOPY, LYSIS OF ADHESIONS, BILATERAL OVARIAN CYSTECTOMY  Surgeon(s) and Role:     * Zamzam Trotter MD - Primary         Assistant Staff: Physician Assistant: Lorri Henderson PA-C      Surgical Staff:  Circ-1: Ander Lugo RN  Circ-2: Sterling Russ  Circ-Relief: Swapna Chapman RN  Physician Assistant: Lorri Henderson PA-C  Scrub Tech-1: Norma cShmidt  Float Staff: Chu Manriquez RN  Event Time In   Incision Start 4477   Incision Close 4753     Anesthesia: General   Estimated Blood Loss: see full op note  Specimens:   ID Type Source Tests Collected by Time Destination   1 : dermoid Fresh Cyst  Zamzam Trotter MD 4/10/2018 0233 Pathology   2 : left ovarian cyst wall Fresh Cyst  Zamzam Trotter MD 4/10/2018 0827 Pathology      Findings: dermoid cyst, chocolate cyst, ovarian cyst   Complications: none  Implants: * No implants in log *

## 2018-04-10 NOTE — PERIOP NOTES
Tisseal 4 mL x2 on sterile field for PRN surgeon use (lot #[id number], exp 09-)    Carolina 3 mL on sterile field for PRN surgeon use    Patient: Saeid Freire MRN: [id number]  SSN: [id number]   YOB: 1980  Age: 41 y.o. Sex: female     Patient is status post Procedure(s):  DAVINCI DIAGNOSTIC LAPAROSCOPY, LYSIS OF ADHESIONS, BILATERAL OVARIAN CYSTECTOMY. Surgeon(s) and Role:     * Ashish Donaldson MD - Primary    Local/Dose/Irrigation:  30 mL 0.5% marcaine plain                  Peripheral IV 04/10/18 Left Hand (Active)   Site Assessment Clean, dry, & intact 4/10/2018  7:11 AM   Phlebitis Assessment 0 4/10/2018  7:11 AM   Infiltration Assessment 0 4/10/2018  7:11 AM   Dressing Status Clean, dry, & intact; New 4/10/2018  7:11 AM   Hub Color/Line Status Pink; Infusing 4/10/2018  7:11 AM            Airway - Endotracheal Tube 04/10/18 Oral (Active)                   Dressing/Packing:  Wound Abdomen-DRESSING TYPE: Topical skin adhesive/glue (exofin) (04/10/18 0846)  Splint/Cast:  ]    Other:  Martins discontinued, ~100 mL urine in bag

## 2018-04-10 NOTE — IP AVS SNAPSHOT
[address] 
[phone] Patient: Ishan Curtis MRN: [id number] ZUP:[id number] About your hospitalization You were admitted on:  April 10, 2018 You last received care in the:  [facility] PACU You were discharged on:  April 10, 2018 Why you were hospitalized Your primary diagnosis was:  Not on File Follow-up Information Follow up With Details Comments Contact Info Anamaria Grimaldo MD   [address] 
[phone] Roel Peters MD Follow up in 1 week(s) call to schedule an appointment [address] [place] [address] [phone]. [phone] Discharge Orders None A check paul indicates which time of day the medication should be taken. My Medications START taking these medications Instructions Each Dose to Equal  
 Morning Noon Evening Bedtime  
 docusate sodium 50 mg capsule Commonly known as:  Bianca Jaimee Your last dose was: Your next dose is: Take 1 Cap by mouth two (2) times a day for 90 days. 50 mg HYDROcodone-acetaminophen 5-325 mg per tablet Commonly known as:  Estuardo Plum Your last dose was: Your next dose is: Take 1 Tab by mouth every six (6) hours as needed for Pain. Max Daily Amount: 4 Tabs. 1 Tab CONTINUE taking these medications Instructions Each Dose to Equal  
 Morning Noon Evening Bedtime OTHER Your last dose was: Your next dose is:    
   
   
 daily. MORINGA NATURAL Basilio [address] ([address] [facility], [address] [facility]) SPIRULINA Your last dose was: Your next dose is:    
   
   
 by Does Not Apply route daily. ZyrTEC 10 mg tablet Generic drug:  cetirizine Your last dose was: Your next dose is: Take 10 mg by mouth daily.   
 10 mg  
    
 STOP taking these medications NUVARING 0.12-0.015 mg/24 hr vaginal ring Generic drug:  ethinyl estradiol-etonogestrel Where to Get Your Medications Information on where to get these meds will be given to you by the nurse or doctor. ! Ask your nurse or doctor about these medications  
  docusate sodium 50 mg capsule HYDROcodone-acetaminophen 5-325 mg per tablet Opioid Education Prescription Opioids: What You Need to Know: 
 
 
______________________________________________________________________ Anesthesia Discharge Instructions After general anesthesia or intervenous sedation, for 24 hours or while taking prescription Narcotics: · Limit your activities · Do not drive or operate hazardous machinery · If you have not urinated within 8 hours after discharge, please contact your surgeon on call. · Do not make important personal or business decisions · Do not drink alcoholic beverages Report the following to your surgeon: 
· Excessive pain, swelling, redness or odor of or around the surgical area · Temperature over 100.5 degrees · Nausea and vomiting lasting longer than 4 hours or if unable to take medication · Any signs of decreased circulation or nerve impairment to extremity:  Change in color, persistent numbness, tingling, coldness or increased pain. · Any questions Pelvic Laparoscopy: What to Expect at [facility] Your Recovery After surgery, it is normal to have a sore belly, cramping, or pain around the cuts the doctor made (incisions) for up to 4 days. You can expect to feel better and stronger each day, although you may get tired quickly and need pain medicine for a few days. Some people are able to return to work the day after surgery, while others need to recover for a few days to a few weeks before going back to work. Sometimes pressure from the gas used during surgery causes other side effects. You may have pain in your neck or shoulders. Or you may feel pressure on your bladder and need to urinate more often than usual. These side effects should go away in less than 4 days. Do not lift anything heavy while you are recovering so that your incisions can heal. 
This care sheet gives you a general idea about how long it will take for you to recover. But each person recovers at a different pace. Follow the steps below to get better as quickly as possible. How can you care for yourself at home? Activity ? · Rest when you feel tired. Getting enough sleep will help you recover. ? · Try to walk each day. Start out by walking a little more than you did the day before. Bit by bit, increase the amount you walk. Walking boosts blood flow and helps prevent pneumonia and constipation. ? · For 1 week, avoid lifting anything that would make you strain. This may include a child, heavy grocery bags and milk containers, a heavy briefcase or backpack, cat litter or dog food bags, or a vacuum . ? · Avoid strenuous activities, such as biking, jogging, weight lifting, and aerobic exercise, for 1 week. ? · You may shower. Pat the incisions dry when you are done. Do not take a bath for the first week after surgery or until your doctor tells you it is okay. ? · You may have some light vaginal bleeding. Wear sanitary pads if needed. Do not douche or use tampons. ? · You may drive when you are no longer taking prescription pain medicine and can quickly move your foot from the gas pedal to the brake. You must also be able to sit comfortably for a long period of time, even if you do not plan to go far. You might get caught in traffic. ? · You may need to take a few days to a few weeks off work. It depends on the type of work you do and how you feel. ? · Do not have sex until your doctor tells you it is okay. Diet ? · You can eat your normal diet. If your stomach is upset, try bland, low-fat foods like plain rice, broiled chicken, toast, and yogurt. ? · Drink plenty of fluids (unless your doctor tells you not to). ? · You may notice that your bowel movements are not regular right after your surgery. This is common. Try to avoid constipation and straining with bowel movements. You may want to take a fiber supplement every day. If you have not had a bowel movement after a couple of days, ask your doctor about taking a mild laxative. Medicines ? · Your doctor will tell you if and when you can restart your medicines. He or she will also give you instructions about taking any new medicines. ? · If you take blood thinners, such as warfarin (Coumadin), clopidogrel (Plavix), or aspirin, be sure to talk to your doctor. He or she will tell you if and when to start taking those medicines again. Make sure that you understand exactly what your doctor wants you to do. ? · Be safe with medicines. Take pain medicines exactly as directed. ¨ If the doctor gave you a prescription medicine for pain, take it as prescribed. ¨ If you are not taking a prescription pain medicine, take an over-the-counter medicine such as acetaminophen (Tylenol), ibuprofen (Advil, Motrin), or naproxen (Aleve). Read and follow all instructions on the label. ¨ Do not take two or more pain medicines at the same time unless the doctor told you to.  Many pain medicines contain acetaminophen, which is Tylenol. Too much acetaminophen (Tylenol) can be harmful. ? · If you think your pain medicine is making you sick to your stomach: 
¨ Take your medicine after meals (unless your doctor tells you not to). ¨ Ask your doctor for a different pain medicine. ? · If your doctor prescribed antibiotics, take them as directed. Do not stop taking them just because you feel better. You need to take the full course of antibiotics. Incision care ? · If you have strips of tape on the incisions, leave the tape on for a week or until it falls off.  
? · Wash the area daily with warm, soapy water and pat it dry. ? · Keep the area clean and dry. You may cover it with a gauze bandage if it weeps or rubs against clothing. Change the bandage every day. Other instructions ? · Wear loose, comfortable clothing, and avoid anything that puts pressure on your belly, such as a girdle, for a few weeks. ? · You may want to use a heating pad on your belly to help with pain. Follow-up care is a key part of your treatment and safety. Be sure to make and go to all appointments, and call your doctor if you are having problems. It's also a good idea to know your test results and keep a list of the medicines you take. When should you call for help? Call 911 anytime you think you may need emergency care. For example, call if: 
? · You passed out (lost consciousness). ? · You have chest pain, are short of breath, or cough up blood. ?Call your doctor now or seek immediate medical care if: 
? · You have bright red vaginal bleeding that soaks one or more pads in an hour, or you have large clots. ? · You are sick to your stomach or cannot drink fluids. ? · You have vaginal discharge that has increased in amount or smells bad.  
? · You have pain that does not get better after you take pain medicine. ? · You have signs of infection, such as: 
¨ Increased pain, swelling, warmth, or redness. ¨ Red streaks leading from the incision. ¨ Pus draining from the incision. ¨ A fever. ? · You have loose stitches, or your incisions come open. ? · Bright red blood has soaked through the bandages over your incisions. ? · You have signs of a blood clot in your leg (called a deep vein thrombosis), such as: 
¨ Pain in your calf, back of the knee, thigh, or groin. ¨ Redness and swelling in your leg. ? · You cannot pass stools or gas. ? Watch closely for changes in your health, and be sure to contact your doctor if you have any problems. Where can you learn more? Go to http://jim-terri.info/. Enter I605 in the search box to learn more about \"Pelvic Laparoscopy: What to Expect at Home. \" Current as of: October 13, 2016 Content Version: 11.4 © 0061-4786 [employer]. Care instructions adapted under license by [employer] (which disclaims liability or warranty for this information). If you have questions about a medical condition or this instruction, always ask your healthcare professional. [place] [address] any warranty or liability for your use of this information. Introducing \[facility] [place] [facility]\"" & HEALTH SERVICES! [facility] introduces F.8 Interactive patient portal. Now you can access parts of your medical record, email your doctor's office, and request medication refills online. 1. In your internet browser, go to [email or web address] 2. Click on the First Time User? Click Here link in the Sign In box. You will see the New Member Sign Up page. 3. Enter your F.8 Interactive Access Code exactly as it appears below. You will not need to use this code after youve completed the sign-up process. If you do not sign up before the expiration date, you must request a new code. · F.8 Interactive Access Code: [email or web address] Expires: 7/8/2018  2:19 PM 
 
4. Enter the last four digits of your Social Security Number (xxxx) and Date of Birth (mm/dd/yyyy) as indicated and click Submit.  You will be taken to the next sign-up page. 5. Create a MMITt ID. This will be your [employer] login ID and cannot be changed, so think of one that is secure and easy to remember. 6. Create a MMITt password. You can change your password at any time. 7. Enter your Password Reset Question and Answer. This can be used at a later time if you forget your password. 8. Enter your e-mail address. You will receive e-mail notification when new information is available in [address] 9. Click Sign Up. You can now view and download portions of your medical record. 10. Click the Download Summary menu link to download a portable copy of your medical information. If you have questions, please visit the Frequently Asked Questions section of the [employer] website. Remember, [employer] is NOT to be used for urgent needs. For medical emergencies, dial 911. Now available from your iPhone and Android! Introducing Marcelo Reynolds As a [facility] patient, I wanted to make you aware of our electronic visit tool called Marcelo Reynolds. [facility] 24/7 allows you to connect within minutes with a medical provider 24 hours a day, seven days a week via a mobile device or tablet or logging into a secure website from your computer. You can access Marcelo Reynolds from anywhere in the [place]. A virtual visit might be right for you when you have a simple condition and feel like you just dont want to get out of bed, or cant get away from work for an appointment, when your regular [facility] provider is not available (evenings, weekends or holidays), or when youre out of town and need minor care. Electronic visits cost only $49 and if the [facility] 24/7 provider determines a prescription is needed to treat your condition, one can be electronically transmitted to a nearby pharmacy*. Please take a moment to enroll today if you have not already done so.   The enrollment process is free and takes just a few minutes. To enroll, please download the [employer] 24/7 lucia to your tablet or phone, or visit [email or web address] to enroll on your computer. And, as an [address] [place] [address] patient with a [employer] account, the results of your visits will be scanned into your electronic medical record and your primary care provider will be able to view the scanned results. We urge you to continue to see your regular [employer] provider for your ongoing medical care. And while your primary care provider may not be the one available when you seek a Socialscope virtual visit, the peace of mind you get from getting a real diagnosis real time can be priceless. For more information on Social[employer], view our Frequently Asked Questions (FAQs) at [email or web address]. Sincerely, 
 
Deena Howell MD 
Chief Medical Officer [facility] Tashia Briceno *:  certain medications cannot be prescribed via Socialscope Providers Seen During Your Hospitalization Provider Specialty Primary office phone Lisette Aponte MD Obstetrics & Gynecology [phone] Your Primary Care Physician (PCP) Primary Care Physician Office Phone Office Fax Topher Barba [address] [facility] You are allergic to the following No active allergies Recent Documentation Height Weight BMI OB Status Smoking Status 1.6 m 86.2 kg 33.66 kg/m2 Having regular periods Never Smoker Emergency Contacts Name Discharge Info Relation Home Work Mobile Hanh Bañuelos DISCHARGE CAREGIVER [3] Parent [1] [phone] Patient Belongings The following personal items are in your possession at time of discharge: 
  Dental Appliances: None  Visual Aid: None   Hearing Aids/Status: Does not own         Clothing:  (clothing bag to pacu) Discharge Instructions Attachments/References MEFS - HYDROCODONE/ACETAMINOPHEN (VICODIN, [address], LORTAB) - (BY MOUTH) (ENGLISH) MEFS - LAXATIVE, STOOL SOFTENERS (DOCULAX, COLACE, COLACE CLEAR, DSS) - (BY MOUTH) (ENGLISH) Patient Handouts Hydrocodone/Acetaminophen (Vicodin, Norco, Lortab) - (By mouth) Why this medicine is used:  
Treats pain. Contact a nurse or doctor right away if you have: · Blistering, peeling, red skin rash · Fast or slow heartbeat, shallow breathing, blue lips, fingernails, or skin · Anxiety, restlessness, muscle spasms, twitching, seeing or hearing things that are not there · Dark urine or pale stools, yellow skin or eyes · Extreme weakness, sweating, seizures, cold or clammy skin · Lightheadedness, dizziness, fainting, fever, sweating Common side effects: 
· Constipation, nausea, vomiting, loss of appetite, stomach pain · Tiredness or sleepiness © 2017 [facility][note 3] Information is for End User's use only and may not be sold, redistributed or otherwise used for commercial purposes. Laxative, Stool Softeners (Doculax, Colace, Colace Clear, DSS) - (By mouth) Why this medicine is used:  
Treats constipation by helping you have a bowel movement. Contact a nurse or doctor right away if you have: · Dark urine or pale stools · Vomiting, loss of appetite, stomach pain · Yellow skin or eyes Common side effects: 
· Nausea, diarrhea, stomach cramps, bitter taste in mouth © 2017 [employer] [facility] Information is for End User's use only and may not be sold, redistributed or otherwise used for commercial purposes. Please provide this summary of care documentation to your next provider. Signatures-by signing, you are acknowledging that this After Visit Summary has been reviewed with you and you have received a copy. Patient Signature:  ____________________________________________________________ Date:  ____________________________________________________________  
  
Dewane Salen Provider Signature:  ____________________________________________________________ Date:  ____________________________________________________________

## 2019-10-30 ENCOUNTER — APPOINTMENT (OUTPATIENT)
Dept: GENERAL RADIOLOGY | Age: 39
End: 2019-10-30
Attending: EMERGENCY MEDICINE
Payer: COMMERCIAL

## 2019-10-30 ENCOUNTER — HOSPITAL ENCOUNTER (EMERGENCY)
Age: 39
Discharge: HOME | End: 2019-10-30
Attending: EMERGENCY MEDICINE
Payer: COMMERCIAL

## 2019-10-30 VITALS
SYSTOLIC BLOOD PRESSURE: 155 MMHG | HEART RATE: 75 BPM | RESPIRATION RATE: 16 BRPM | TEMPERATURE: 98.2 F | WEIGHT: 190.26 LBS | OXYGEN SATURATION: 100 % | DIASTOLIC BLOOD PRESSURE: 104 MMHG | BODY MASS INDEX: 33.7 KG/M2

## 2019-10-30 DIAGNOSIS — M43.6 TORTICOLLIS: ICD-10-CM

## 2019-10-30 DIAGNOSIS — R07.9 ACUTE CHEST PAIN: Primary | ICD-10-CM

## 2019-10-30 LAB
ALBUMIN SERPL-MCNC: 3.4 G/DL (ref 3.5–5)
ALBUMIN/GLOB SERPL: 0.8 {RATIO} (ref 1.1–2.2)
ALP SERPL-CCNC: 61 U/L (ref 45–117)
ALT SERPL-CCNC: 24 U/L (ref 12–78)
ANION GAP SERPL CALC-SCNC: 4 MMOL/L (ref 5–15)
AST SERPL-CCNC: 17 U/L (ref 15–37)
BASOPHILS # BLD: 0.1 K/UL (ref 0–0.1)
BASOPHILS NFR BLD: 1 % (ref 0–1)
BILIRUB SERPL-MCNC: 0.2 MG/DL (ref 0.2–1)
BUN SERPL-MCNC: 14 MG/DL (ref 6–20)
BUN/CREAT SERPL: 15 (ref 12–20)
CALCIUM SERPL-MCNC: 8.5 MG/DL (ref 8.5–10.1)
CHLORIDE SERPL-SCNC: 110 MMOL/L (ref 97–108)
CK SERPL-CCNC: 401 U/L (ref 26–192)
CO2 SERPL-SCNC: 26 MMOL/L (ref 21–32)
CREAT SERPL-MCNC: 0.94 MG/DL (ref 0.55–1.02)
DIFFERENTIAL METHOD BLD: ABNORMAL
EOSINOPHIL # BLD: 0.4 K/UL (ref 0–0.4)
EOSINOPHIL NFR BLD: 5 % (ref 0–7)
ERYTHROCYTE [DISTWIDTH] IN BLOOD BY AUTOMATED COUNT: 13.4 % (ref 11.5–14.5)
GLOBULIN SER CALC-MCNC: 4.3 G/DL (ref 2–4)
GLUCOSE SERPL-MCNC: 75 MG/DL (ref 65–100)
HCT VFR BLD AUTO: 37.9 % (ref 35–47)
HGB BLD-MCNC: 12.3 G/DL (ref 11.5–16)
IMM GRANULOCYTES # BLD AUTO: 0 K/UL (ref 0–0.04)
IMM GRANULOCYTES NFR BLD AUTO: 0 % (ref 0–0.5)
LYMPHOCYTES # BLD: 3.4 K/UL (ref 0.8–3.5)
LYMPHOCYTES NFR BLD: 41 % (ref 12–49)
MCH RBC QN AUTO: 28.4 PG (ref 26–34)
MCHC RBC AUTO-ENTMCNC: 32.5 G/DL (ref 30–36.5)
MCV RBC AUTO: 87.5 FL (ref 80–99)
MONOCYTES # BLD: 0.7 K/UL (ref 0–1)
MONOCYTES NFR BLD: 9 % (ref 5–13)
NEUTS SEG # BLD: 3.8 K/UL (ref 1.8–8)
NEUTS SEG NFR BLD: 44 % (ref 32–75)
NRBC # BLD: 0 K/UL (ref 0–0.01)
NRBC BLD-RTO: 0 PER 100 WBC
PLATELET # BLD AUTO: 415 K/UL (ref 150–400)
PMV BLD AUTO: 9.2 FL (ref 8.9–12.9)
POTASSIUM SERPL-SCNC: 3.8 MMOL/L (ref 3.5–5.1)
PROT SERPL-MCNC: 7.7 G/DL (ref 6.4–8.2)
RBC # BLD AUTO: 4.33 M/UL (ref 3.8–5.2)
SODIUM SERPL-SCNC: 140 MMOL/L (ref 136–145)
TROPONIN I SERPL-MCNC: <0.05 NG/ML
WBC # BLD AUTO: 8.5 K/UL (ref 3.6–11)

## 2019-10-30 PROCEDURE — 99283 EMERGENCY DEPT VISIT LOW MDM: CPT

## 2019-10-30 PROCEDURE — 82550 ASSAY OF CK (CPK): CPT

## 2019-10-30 PROCEDURE — 80053 COMPREHEN METABOLIC PANEL: CPT

## 2019-10-30 PROCEDURE — 85025 COMPLETE CBC W/AUTO DIFF WBC: CPT

## 2019-10-30 PROCEDURE — 93005 ELECTROCARDIOGRAM TRACING: CPT

## 2019-10-30 PROCEDURE — 36415 COLL VENOUS BLD VENIPUNCTURE: CPT

## 2019-10-30 PROCEDURE — 84484 ASSAY OF TROPONIN QUANT: CPT

## 2019-10-30 PROCEDURE — 71046 X-RAY EXAM CHEST 2 VIEWS: CPT

## 2019-10-30 PROCEDURE — 72050 X-RAY EXAM NECK SPINE 4/5VWS: CPT

## 2019-10-30 RX ORDER — CYCLOBENZAPRINE HCL 10 MG
10 TABLET ORAL
Qty: 20 TAB | Refills: 0 | Status: SHIPPED | OUTPATIENT
Start: 2019-10-30 | End: 2022-04-15

## 2019-10-30 RX ORDER — NAPROXEN 500 MG/1
500 TABLET ORAL
Qty: 20 TAB | Refills: 0 | Status: SHIPPED | OUTPATIENT
Start: 2019-10-30

## 2019-10-30 NOTE — ED PROVIDER NOTES
EMERGENCY DEPARTMENT HISTORY AND PHYSICAL EXAM      Date: 10/30/2019  Patient Name: Clif Marsh    History of Presenting Illness     Chief Complaint   Patient presents with    Neck Pain     pt reports she was sent by [facility] for abnormal E[facility], reports neck pain x1 week, chest pain radiating back today, was given ASA at [facility]    Back Pain    Chest Pain       History Provided By: Patient    HPI: Clif Marsh, 46 y.o. female presents to the ED with cc of chest pain, neck pain and abnormal EKG. The patient states that she has had left-sided neck pain for 1 week. She denies any trauma. She states the pain was an 8 out of 10 at one point, but is currently a 3 out of 10 in severity. She denies numbness, tingling or headache. The patient started to experience chest pain last night. The pain is lower sternal, radiating to the back. She states that it is currently mild in severity. The pain is intermittent and lasts for minutes at a time. She denies shortness of breath, nausea or diaphoresis. [facility]. The patient denies leg pain or leg edema. There are no other complaints, changes, or physical findings at this time. PCP: None    No current facility-administered medications on file prior to encounter. Current Outpatient Medications on File Prior to Encounter   Medication Sig Dispense Refill    HYDROcodone-acetaminophen (NORCO) 5-325 mg per tablet Take 1 Tab by mouth every six (6) hours as needed for Pain. Max Daily Amount: 4 Tabs. 30 Tab 0    OTHER daily. MORINGA NATURAL SUPPLIMENT (HEMP, SUYAPA)      BLUE-GREEN ALGAE (SPIRULINA) by Does Not Apply route daily.  cetirizine (ZYRTEC) 10 mg tablet Take 10 mg by mouth daily.          Past History     Past Medical History:  Past Medical History:   Diagnosis Date    Allergic rhinitis     Endometriosis        Past Surgical History:  Past Surgical History:   Procedure Laterality Date    HX ABDOMINAL LAPAROSCOPY      HX GYN      endometriosis, LAP.    HX HEENT      WISDOM TEETH, ORAL SURGERY. Family History:  Family History   Problem Relation Age of Onset    Cancer Other         breast cancer, 42's    Hypertension Mother     Cancer Maternal Grandfather 67        Prostate, colon    Anesth Problems Neg Hx        Social History:  Social History     Tobacco Use    Smoking status: Never Smoker    Smokeless tobacco: Never Used    Tobacco comment: TRIED SMOKING YEARS AGO, TEENAGER. Substance Use Topics    Alcohol use: Yes     Alcohol/week: 0.0 standard drinks     Comment: Occasionally    Drug use: No       Allergies:  No Known Allergies      Review of Systems   Review of Systems   Constitutional: Negative for chills and fever. HENT: Negative for congestion. Eyes: Negative. Respiratory: Negative for shortness of breath. Cardiovascular: Positive for chest pain. Gastrointestinal: Negative for abdominal pain. Endocrine: Negative for heat intolerance. Genitourinary: Negative for dysuria. Musculoskeletal: Positive for back pain and neck pain. Skin: Negative for rash. Allergic/Immunologic: Negative for immunocompromised state. Neurological: Negative for headaches. Hematological: Does not bruise/bleed easily. Psychiatric/Behavioral: Negative. All other systems reviewed and are negative. Physical Exam   Physical Exam   Constitutional: She is oriented to person, place, and time. She appears well-developed and well-nourished. No distress. HENT:   Head: Normocephalic. Eyes: Pupils are equal, round, and reactive to light. EOM are normal.   Neck: Normal range of motion. Neck supple. No cervical tenderness   Cardiovascular: Normal rate, regular rhythm, normal heart sounds and intact distal pulses. Pulmonary/Chest: Effort normal and breath sounds normal. No respiratory distress. She exhibits tenderness. Reproducible chest wall tenderness   Abdominal: Soft. Bowel sounds are normal. There is no tenderness. Musculoskeletal: Normal range of motion. She exhibits no edema or tenderness. Neurological: She is alert and oriented to person, place, and time. No cranial nerve deficit. Skin: Skin is warm and dry. Psychiatric: She has a normal mood and affect. Her behavior is normal.   Nursing note and vitals reviewed. Diagnostic Study Results     Labs -     Recent Results (from the past 12 hour(s))   CBC WITH AUTOMATED DIFF    Collection Time: 10/30/19  5:46 PM   Result Value Ref Range    WBC 8.5 3.6 - 11.0 K/uL    RBC 4.33 3.80 - 5.20 M/uL    HGB 12.3 11.5 - 16.0 g/dL    HCT 37.9 35.0 - 47.0 %    MCV 87.5 80.0 - 99.0 FL    MCH 28.4 26.0 - 34.0 PG    MCHC 32.5 30.0 - 36.5 g/dL    RDW 13.4 11.5 - 14.5 %    PLATELET 145 (H) 425 - 400 K/uL    MPV 9.2 8.9 - 12.9 FL    NRBC 0.0 0  WBC    ABSOLUTE NRBC 0.00 0.00 - 0.01 K/uL    NEUTROPHILS 44 32 - 75 %    LYMPHOCYTES 41 12 - 49 %    MONOCYTES 9 5 - 13 %    EOSINOPHILS 5 0 - 7 %    BASOPHILS 1 0 - 1 %    IMMATURE GRANULOCYTES 0 0.0 - 0.5 %    ABS. NEUTROPHILS 3.8 1.8 - 8.0 K/UL    ABS. LYMPHOCYTES 3.4 0.8 - 3.5 K/UL    ABS. MONOCYTES 0.7 0.0 - 1.0 K/UL    ABS. EOSINOPHILS 0.4 0.0 - 0.4 K/UL    ABS. BASOPHILS 0.1 0.0 - 0.1 K/UL    ABS. IMM.  GRANS. 0.0 0.00 - 0.04 K/UL    DF AUTOMATED     TROPONIN I    Collection Time: 10/30/19  6:35 PM   Result Value Ref Range    Troponin-I, Qt. <0.05 <0.05 ng/mL   CK    Collection Time: 10/30/19  6:35 PM   Result Value Ref Range     (H) 26 - 341 U/L   METABOLIC PANEL, COMPREHENSIVE    Collection Time: 10/30/19  6:35 PM   Result Value Ref Range    Sodium 140 136 - 145 mmol/L    Potassium 3.8 3.5 - 5.1 mmol/L    Chloride 110 (H) 97 - 108 mmol/L    CO2 26 21 - 32 mmol/L    Anion gap 4 (L) 5 - 15 mmol/L    Glucose 75 65 - 100 mg/dL    BUN 14 6 - 20 MG/DL    Creatinine 0.94 0.55 - 1.02 MG/DL    BUN/Creatinine ratio 15 12 - 20      GFR est AA >60 >60 ml/min/1.73m2    GFR est non-AA >60 >60 ml/min/1.73m2    Calcium 8.5 8.5 - 10.1 MG/DL    Bilirubin, total 0.2 0.2 - 1.0 MG/DL    ALT (SGPT) 24 12 - 78 U/L    AST (SGOT) 17 15 - 37 U/L    Alk. phosphatase 61 45 - 117 U/L    Protein, total 7.7 6.4 - 8.2 g/dL    Albumin 3.4 (L) 3.5 - 5.0 g/dL    Globulin 4.3 (H) 2.0 - 4.0 g/dL    A-G Ratio 0.8 (L) 1.1 - 2.2         Radiologic Studies -   XR CHEST PA LAT   Final Result   IMPRESSION:   1. No radiographic evidence of acute cardiopulmonary disease. XR SPINE CERV 4 OR 5 V   Final Result   IMPRESSION:  No acute pathology is identified. CT Results  (Last 48 hours)    None        CXR Results  (Last 48 hours)               10/30/19 1845  XR CHEST PA LAT Final result    Impression:  IMPRESSION:   1. No radiographic evidence of acute cardiopulmonary disease. Narrative:  INDICATION: . cp   Additional history: Chest pain radiating to the back. [facility]   COMPARISON: Previous chest xray, 5/24/2007. [place] [facility] FINDINGS: PA and lateral view of the chest.    .   Lines/tubes/surgical: Cardiac monitor leads overly the patient. Heart/mediastinum: Unremarkable. Lungs/pleura:  No focal consolidation or mass. No visualized pleural effusion or   pneumothorax. Additional Comments: None. .             Medical Decision Making   I am the first provider for this patient. I reviewed the vital signs, available nursing notes, past medical history, past surgical history, family history and social history. Vital Signs-Reviewed the patient's vital signs. Patient Vitals for the past 12 hrs:   Temp Pulse Resp BP SpO2   10/30/19 1706 98.2 °F (36.8 °C) 75 16 (!) 155/104 100 %       EKG interpretation: (Preliminary)  Rhythm: normal sinus rhythm; and regular . Rate (approx.): 66; Axis: normal; AR interval: normal; QRS interval: normal ; ST/T wave: non-specific changes; Other findings: No previous EKG.     Records Reviewed: Nursing Notes, Old Medical Records, Previous Radiology Studies, Previous Laboratory Studies and Clinic sheet    Provider Notes (Medical Decision Making):   Torticollis, arthritis, radiculopathy, musculoskeletal, CAD,    ED Course:   Initial assessment performed. The patients presenting problems have been discussed, and they are in agreement with the care plan formulated and outlined with them. I have encouraged them to ask questions as they arise throughout their visit. Progress note: The patient is feeling better. Her results were reviewed. She is advised to follow-up and return to ER if worse         Critical Care Time:   0    Disposition:  home    PLAN:  1. Discharge Medication List as of 10/30/2019  7:41 PM      START taking these medications    Details   cyclobenzaprine (FLEXERIL) 10 mg tablet Take 1 Tab by mouth three (3) times daily as needed for Muscle Spasm(s). , Normal, Disp-20 Tab, R-0      naproxen (NAPROSYN) 500 mg tablet Take 1 Tab by mouth every twelve (12) hours as needed for Pain., Normal, Disp-20 Tab, R-0         CONTINUE these medications which have NOT CHANGED    Details   HYDROcodone-acetaminophen (NORCO) 5-325 mg per tablet Take 1 Tab by mouth every six (6) hours as needed for Pain. Max Daily Amount: 4 Tabs., Print, Disp-30 Tab, R-0      OTHER daily. MORINGA NATURAL SUPPLIMENT ([facility]), Historical Med      BLUE-GREEN ALGAE (SPIRULINA) by Does Not Apply route daily. , Historical Med      cetirizine (ZYRTEC) 10 mg tablet Take 10 mg by mouth daily. , Historical Med           2. Follow-up Information     Follow up With Specialties Details Why Contact Info    Jareth Hermosillo MD Cardiology, [address] [facility] Vascular Surgery, Internal Medicine Call in 1 day  [address]. [phone]      [facility] EMERGENCY DEPT Emergency Medicine  If symptoms worsen [address] [facility]  [address] [facility]  [phone]        Return to ED if worse     Diagnosis     Clinical Impression:   1.  Acute chest pain    2. Torticollis        Attestations:    Regine Pennington MD    Please note that this dictation was completed with [employer], the computer voice recognition software. Quite often unanticipated grammatical, syntax, homophones, and other interpretive errors are inadvertently transcribed by the computer software. Please disregard these errors. Please excuse any errors that have escaped final proofreading. Thank you.

## 2019-10-30 NOTE — DISCHARGE INSTRUCTIONS
Patient Education        Chest Pain: Care Instructions  Your Care Instructions    There are many things that can cause chest pain. Some are not serious and will get better on their own in a few days. But some kinds of chest pain need more testing and treatment. Your doctor may have recommended a follow-up visit in the next 8 to 12 hours. If you are not getting better, you may need more tests or treatment. Even though your doctor has released you, you still need to watch for any problems. The doctor carefully checked you, but sometimes problems can develop later. If you have new symptoms or if your symptoms do not get better, get medical care right away. If you have worse or different chest pain or pressure that lasts more than 5 minutes or you passed out (lost consciousness), call 911 or seek other emergency help right away. A medical visit is only one step in your treatment. Even if you feel better, you still need to do what your doctor recommends, such as going to all suggested follow-up appointments and taking medicines exactly as directed. This will help you recover and help prevent future problems. How can you care for yourself at home? · Rest until you feel better. · Take your medicine exactly as prescribed. Call your doctor if you think you are having a problem with your medicine. · Do not drive after taking a prescription pain medicine. When should you call for help? Call 911 if:    · You passed out (lost consciousness).     · You have severe difficulty breathing.     · You have symptoms of a heart attack. These may include:  ? Chest pain or pressure, or a strange feeling in your chest.  ? Sweating. ? Shortness of breath. ? Nausea or vomiting. ? Pain, pressure, or a strange feeling in your back, neck, jaw, or upper belly or in one or both shoulders or arms. ? Lightheadedness or sudden weakness. ? A fast or irregular heartbeat.   After you call 911, the  may tell you to chew 1 adult-strength or 2 to 4 low-dose aspirin. Wait for an ambulance. Do not try to drive yourself.    Call your doctor today if:    · You have any trouble breathing.     · Your chest pain gets worse.     · You are dizzy or lightheaded, or you feel like you may faint.     · You are not getting better as expected.     · You are having new or different chest pain. Where can you learn more? Go to http://jim-terri.info/. Enter A120 in the search box to learn more about \"Chest Pain: Care Instructions. \"  Current as of: June 26, 2019  Content Version: 12.2  © 1149-1326 [employer]. Care instructions adapted under license by [employer] (which disclaims liability or warranty for this information). If you have questions about a medical condition or this instruction, always ask your healthcare professional. [place] [address] any warranty or liability for your use of this information. Patient Education        Torticollis: Care Instructions  Your Care Instructions  Torticollis is a severe tightness of the muscles on one side of the neck. The tight muscles can make the head turn to one side, lean to one side, or be pulled forward or backward. It is also called wryneck. Your doctor asked questions about your health and examined you. You may also have had X-rays or other tests. If your doctor thinks another medical problem is causing your tight neck muscles, you may need more tests. Torticollis usually gets better with home care. Your doctor may have you take medicine to relieve pain or relax your muscles. He or she may suggest exercise and physical therapy to help increase flexibility and relieve stress. Your doctor may also have you wear a special collar, called a cervical collar, for a day or two. The collar may help make your neck more comfortable. Follow-up care is a key part of your treatment and safety.  Be sure to make and go to all appointments, and call your doctor if you are having problems. It's also a good idea to know your test results and keep a list of the medicines you take. How can you care for yourself at home? · Be safe with medicines. Read and follow all instructions on the label. ? If the doctor gave you a prescription medicine for pain, take it as prescribed. ? If you are not taking a prescription pain medicine, ask your doctor if you can take an over-the-counter medicine. · Try using a heating pad on a low or medium setting for 15 to 20 minutes every 2 or 3 hours. Try a warm shower in place of one session with the heating pad. · Try using an ice pack for 10 to 15 minutes every 2 to 3 hours. Put a thin cloth between the ice and your skin. · If your doctor recommends a cervical collar, wear it exactly as directed. When should you call for help? Call your doctor now or seek immediate medical care if:    · You have new or worse numbness in your arms, buttocks, or legs.     · You have new or worse weakness in your arms or legs.     · Your neck pain gets worse.     · You lose bladder or bowel control.    Watch closely for changes in your health, and be sure to contact your doctor if:    · You do not get better as expected. Where can you learn more? Go to http://jim-terri.info/. Enter G813 in the search box to learn more about \"Torticollis: Care Instructions. \"  Current as of: June 26, 2019  Content Version: 12.2  © 4739-4846 [employer]. Care instructions adapted under license by [employer] (which disclaims liability or warranty for this information). If you have questions about a medical condition or this instruction, always ask your healthcare professional. [place] [address] any warranty or liability for your use of this information.

## 2019-10-31 LAB
ATRIAL RATE: 66 BPM
CALCULATED P AXIS, ECG09: 46 DEGREES
CALCULATED R AXIS, ECG10: 16 DEGREES
CALCULATED T AXIS, ECG11: 126 DEGREES
DIAGNOSIS, 93000: NORMAL
P-R INTERVAL, ECG05: 126 MS
Q-T INTERVAL, ECG07: 410 MS
QRS DURATION, ECG06: 122 MS
QTC CALCULATION (BEZET), ECG08: 429 MS
VENTRICULAR RATE, ECG03: 66 BPM

## 2019-10-31 NOTE — ED NOTES
Pt discharged by Dr. Divina Driscoll. Pt provided with discharge instructions Rx and instructions on follow up care. Pt out of ED in stable condition accompanied by family.

## 2020-10-21 ENCOUNTER — TRANSCRIBE ORDER (OUTPATIENT)
Dept: SCHEDULING | Age: 40
End: 2020-10-21

## 2020-10-21 DIAGNOSIS — Z12.31 BREAST CANCER SCREENING BY MAMMOGRAM: Primary | ICD-10-CM

## 2021-04-14 ENCOUNTER — TELEPHONE (OUTPATIENT)
Dept: SLEEP MEDICINE | Age: 41
End: 2021-04-14

## 2021-04-14 NOTE — TELEPHONE ENCOUNTER
Mailbox full, unable to leave message to confirm appointment and to follow up on new patient paperwork that we still need.      Sending [employer],

## 2021-08-10 ENCOUNTER — TRANSCRIBE ORDER (OUTPATIENT)
Dept: SCHEDULING | Age: 41
End: 2021-08-10

## 2021-08-10 DIAGNOSIS — Z12.31 ENCOUNTER FOR SCREENING MAMMOGRAM FOR MALIGNANT NEOPLASM OF BREAST: Primary | ICD-10-CM

## 2021-08-19 ENCOUNTER — HOSPITAL ENCOUNTER (OUTPATIENT)
Dept: MAMMOGRAPHY | Age: 41
Discharge: HOME | End: 2021-08-19
Attending: SPECIALIST
Payer: COMMERCIAL

## 2021-08-19 DIAGNOSIS — Z12.31 ENCOUNTER FOR SCREENING MAMMOGRAM FOR MALIGNANT NEOPLASM OF BREAST: ICD-10-CM

## 2021-08-19 PROCEDURE — 77067 SCR MAMMO BI INCL CAD: CPT

## 2022-04-19 ENCOUNTER — ANESTHESIA EVENT (OUTPATIENT)
Dept: SURGERY | Age: 42
End: 2022-04-19
Payer: COMMERCIAL

## 2022-04-20 ENCOUNTER — ANESTHESIA (OUTPATIENT)
Dept: SURGERY | Age: 42
End: 2022-04-20
Payer: COMMERCIAL

## 2022-04-20 ENCOUNTER — HOSPITAL ENCOUNTER (OUTPATIENT)
Age: 42
Setting detail: OUTPATIENT SURGERY
Discharge: HOME | End: 2022-04-20
Attending: SPECIALIST | Admitting: SPECIALIST
Payer: COMMERCIAL

## 2022-04-20 VITALS
DIASTOLIC BLOOD PRESSURE: 92 MMHG | WEIGHT: 195 LBS | TEMPERATURE: 98.2 F | BODY MASS INDEX: 34.55 KG/M2 | HEART RATE: 74 BPM | SYSTOLIC BLOOD PRESSURE: 136 MMHG | RESPIRATION RATE: 18 BRPM | HEIGHT: 63 IN | OXYGEN SATURATION: 96 %

## 2022-04-20 DIAGNOSIS — R52 PAIN: Primary | ICD-10-CM

## 2022-04-20 LAB — HCG UR QL: NEGATIVE

## 2022-04-20 PROCEDURE — 74011250636 HC RX REV CODE- 250/636: Performed by: ANESTHESIOLOGY

## 2022-04-20 PROCEDURE — 81025 URINE PREGNANCY TEST: CPT

## 2022-04-20 PROCEDURE — 77030002933 HC SUT MCRYL J&J -A: Performed by: SPECIALIST

## 2022-04-20 PROCEDURE — 77030041238 HC TBNG INSUF MDII -A: Performed by: SPECIALIST

## 2022-04-20 PROCEDURE — 77030031139 HC SUT VCRL2 J&J -A: Performed by: SPECIALIST

## 2022-04-20 PROCEDURE — 77030022704 HC SUT VLOC COVD -B: Performed by: SPECIALIST

## 2022-04-20 PROCEDURE — 77030041460 HC APL VISTASEAL J&J -B: Performed by: SPECIALIST

## 2022-04-20 PROCEDURE — 77030040356 HC CORD BPLR FRCP COVD -A: Performed by: SPECIALIST

## 2022-04-20 PROCEDURE — 76210000000 HC OR PH I REC 2 TO 2.5 HR: Performed by: SPECIALIST

## 2022-04-20 PROCEDURE — 74011250636 HC RX REV CODE- 250/636: Performed by: NURSE ANESTHETIST, CERTIFIED REGISTERED

## 2022-04-20 PROCEDURE — 76060000033 HC ANESTHESIA 1 TO 1.5 HR: Performed by: SPECIALIST

## 2022-04-20 PROCEDURE — 76010000934 HC OR TIME 1 TO 1.5HR INTENSV - TIER 2: Performed by: SPECIALIST

## 2022-04-20 PROCEDURE — 77030008603 HC TRCR ENDOSC EPATH J&J -C: Performed by: SPECIALIST

## 2022-04-20 PROCEDURE — 77030026243 HC MANIP UTER VCAR LSIS -B: Performed by: SPECIALIST

## 2022-04-20 PROCEDURE — 77030040922 HC BLNKT HYPOTHRM STRY -A

## 2022-04-20 PROCEDURE — 88305 TISSUE EXAM BY PATHOLOGIST: CPT

## 2022-04-20 PROCEDURE — 74011000250 HC RX REV CODE- 250: Performed by: SPECIALIST

## 2022-04-20 PROCEDURE — 77030035277 HC OBTRTR BLDELSS DISP INTU -B: Performed by: SPECIALIST

## 2022-04-20 PROCEDURE — 77030008598 HC TRCR ENDOSC BLDLS J&J -B: Performed by: SPECIALIST

## 2022-04-20 PROCEDURE — 77030003666 HC NDL SPINAL BD -A: Performed by: SPECIALIST

## 2022-04-20 PROCEDURE — 74011250637 HC RX REV CODE- 250/637: Performed by: ANESTHESIOLOGY

## 2022-04-20 PROCEDURE — 74011000250 HC RX REV CODE- 250: Performed by: NURSE ANESTHETIST, CERTIFIED REGISTERED

## 2022-04-20 PROCEDURE — 76210000020 HC REC RM PH II FIRST 0.5 HR: Performed by: SPECIALIST

## 2022-04-20 PROCEDURE — 77030039895 HC SYST SMK EVAC LAP COVD -B: Performed by: SPECIALIST

## 2022-04-20 PROCEDURE — 77030003578 HC NDL INSUF VERES AMR -B: Performed by: SPECIALIST

## 2022-04-20 PROCEDURE — 2709999900 HC NON-CHARGEABLE SUPPLY: Performed by: SPECIALIST

## 2022-04-20 PROCEDURE — 77030026438 HC STYL ET INTUB CARD -A: Performed by: ANESTHESIOLOGY

## 2022-04-20 PROCEDURE — 77030029357 HC DEV CLSR FAC SYS EFX TELE -C: Performed by: SPECIALIST

## 2022-04-20 PROCEDURE — 77030013079 HC BLNKT BAIR HGGR 3M -A: Performed by: ANESTHESIOLOGY

## 2022-04-20 PROCEDURE — 77030008684 HC TU ET CUF COVD -B: Performed by: ANESTHESIOLOGY

## 2022-04-20 PROCEDURE — 77030002895 HC DEV VASC CLOSR COVD -B: Performed by: SPECIALIST

## 2022-04-20 PROCEDURE — 88307 TISSUE EXAM BY PATHOLOGIST: CPT

## 2022-04-20 PROCEDURE — 77030031605: Performed by: SPECIALIST

## 2022-04-20 RX ORDER — KETAMINE HCL IN 0.9 % NACL 50 MG/5 ML
SYRINGE (ML) INTRAVENOUS AS NEEDED
Status: DISCONTINUED | OUTPATIENT
Start: 2022-04-20 | End: 2022-04-20 | Stop reason: HOSPADM

## 2022-04-20 RX ORDER — SODIUM CHLORIDE 9 MG/ML
25 INJECTION, SOLUTION INTRAVENOUS CONTINUOUS
Status: DISCONTINUED | OUTPATIENT
Start: 2022-04-20 | End: 2022-04-20 | Stop reason: HOSPADM

## 2022-04-20 RX ORDER — MIDAZOLAM HYDROCHLORIDE 1 MG/ML
0.5 INJECTION, SOLUTION INTRAMUSCULAR; INTRAVENOUS
Status: DISCONTINUED | OUTPATIENT
Start: 2022-04-20 | End: 2022-04-20 | Stop reason: HOSPADM

## 2022-04-20 RX ORDER — FENTANYL CITRATE 50 UG/ML
INJECTION, SOLUTION INTRAMUSCULAR; INTRAVENOUS AS NEEDED
Status: DISCONTINUED | OUTPATIENT
Start: 2022-04-20 | End: 2022-04-20 | Stop reason: HOSPADM

## 2022-04-20 RX ORDER — OXYCODONE AND ACETAMINOPHEN 5; 325 MG/1; MG/1
1 TABLET ORAL
Qty: 20 TABLET | Refills: 0 | Status: SHIPPED | OUTPATIENT
Start: 2022-04-20 | End: 2022-04-25

## 2022-04-20 RX ORDER — LIDOCAINE HYDROCHLORIDE 20 MG/ML
INJECTION, SOLUTION EPIDURAL; INFILTRATION; INTRACAUDAL; PERINEURAL AS NEEDED
Status: DISCONTINUED | OUTPATIENT
Start: 2022-04-20 | End: 2022-04-20 | Stop reason: HOSPADM

## 2022-04-20 RX ORDER — KETOROLAC TROMETHAMINE 30 MG/ML
INJECTION, SOLUTION INTRAMUSCULAR; INTRAVENOUS AS NEEDED
Status: DISCONTINUED | OUTPATIENT
Start: 2022-04-20 | End: 2022-04-20 | Stop reason: HOSPADM

## 2022-04-20 RX ORDER — DEXAMETHASONE SODIUM PHOSPHATE 4 MG/ML
INJECTION, SOLUTION INTRA-ARTICULAR; INTRALESIONAL; INTRAMUSCULAR; INTRAVENOUS; SOFT TISSUE AS NEEDED
Status: DISCONTINUED | OUTPATIENT
Start: 2022-04-20 | End: 2022-04-20 | Stop reason: HOSPADM

## 2022-04-20 RX ORDER — HYDROCODONE BITARTRATE AND ACETAMINOPHEN 5; 325 MG/1; MG/1
1 TABLET ORAL AS NEEDED
Status: DISCONTINUED | OUTPATIENT
Start: 2022-04-20 | End: 2022-04-20 | Stop reason: HOSPADM

## 2022-04-20 RX ORDER — ONDANSETRON 2 MG/ML
4 INJECTION INTRAMUSCULAR; INTRAVENOUS AS NEEDED
Status: DISCONTINUED | OUTPATIENT
Start: 2022-04-20 | End: 2022-04-20 | Stop reason: HOSPADM

## 2022-04-20 RX ORDER — ROPIVACAINE HYDROCHLORIDE 5 MG/ML
30 INJECTION, SOLUTION EPIDURAL; INFILTRATION; PERINEURAL AS NEEDED
Status: DISCONTINUED | OUTPATIENT
Start: 2022-04-20 | End: 2022-04-20 | Stop reason: HOSPADM

## 2022-04-20 RX ORDER — MORPHINE SULFATE 2 MG/ML
2 INJECTION, SOLUTION INTRAMUSCULAR; INTRAVENOUS
Status: DISCONTINUED | OUTPATIENT
Start: 2022-04-20 | End: 2022-04-20 | Stop reason: HOSPADM

## 2022-04-20 RX ORDER — GLYCOPYRROLATE 0.2 MG/ML
0.2 INJECTION INTRAMUSCULAR; INTRAVENOUS
Status: DISCONTINUED | OUTPATIENT
Start: 2022-04-20 | End: 2022-04-20 | Stop reason: HOSPADM

## 2022-04-20 RX ORDER — ROCURONIUM BROMIDE 10 MG/ML
INJECTION, SOLUTION INTRAVENOUS AS NEEDED
Status: DISCONTINUED | OUTPATIENT
Start: 2022-04-20 | End: 2022-04-20 | Stop reason: HOSPADM

## 2022-04-20 RX ORDER — LIDOCAINE HYDROCHLORIDE 10 MG/ML
0.1 INJECTION, SOLUTION EPIDURAL; INFILTRATION; INTRACAUDAL; PERINEURAL AS NEEDED
Status: DISCONTINUED | OUTPATIENT
Start: 2022-04-20 | End: 2022-04-20 | Stop reason: HOSPADM

## 2022-04-20 RX ORDER — DIPHENHYDRAMINE HYDROCHLORIDE 50 MG/ML
12.5 INJECTION, SOLUTION INTRAMUSCULAR; INTRAVENOUS AS NEEDED
Status: DISCONTINUED | OUTPATIENT
Start: 2022-04-20 | End: 2022-04-20 | Stop reason: HOSPADM

## 2022-04-20 RX ORDER — MIDAZOLAM HYDROCHLORIDE 1 MG/ML
1 INJECTION, SOLUTION INTRAMUSCULAR; INTRAVENOUS AS NEEDED
Status: DISCONTINUED | OUTPATIENT
Start: 2022-04-20 | End: 2022-04-20 | Stop reason: HOSPADM

## 2022-04-20 RX ORDER — NEOSTIGMINE METHYLSULFATE 1 MG/ML
INJECTION, SOLUTION INTRAVENOUS AS NEEDED
Status: DISCONTINUED | OUTPATIENT
Start: 2022-04-20 | End: 2022-04-20 | Stop reason: HOSPADM

## 2022-04-20 RX ORDER — HYDROMORPHONE HYDROCHLORIDE 1 MG/ML
0.2 INJECTION, SOLUTION INTRAMUSCULAR; INTRAVENOUS; SUBCUTANEOUS
Status: DISCONTINUED | OUTPATIENT
Start: 2022-04-20 | End: 2022-04-20 | Stop reason: HOSPADM

## 2022-04-20 RX ORDER — PROPOFOL 10 MG/ML
INJECTION, EMULSION INTRAVENOUS AS NEEDED
Status: DISCONTINUED | OUTPATIENT
Start: 2022-04-20 | End: 2022-04-20 | Stop reason: HOSPADM

## 2022-04-20 RX ORDER — HYDROMORPHONE HYDROCHLORIDE 2 MG/ML
INJECTION, SOLUTION INTRAMUSCULAR; INTRAVENOUS; SUBCUTANEOUS AS NEEDED
Status: DISCONTINUED | OUTPATIENT
Start: 2022-04-20 | End: 2022-04-20 | Stop reason: HOSPADM

## 2022-04-20 RX ORDER — BUPIVACAINE HYDROCHLORIDE 2.5 MG/ML
INJECTION, SOLUTION EPIDURAL; INFILTRATION; INTRACAUDAL AS NEEDED
Status: DISCONTINUED | OUTPATIENT
Start: 2022-04-20 | End: 2022-04-20 | Stop reason: HOSPADM

## 2022-04-20 RX ORDER — MIDAZOLAM HYDROCHLORIDE 1 MG/ML
INJECTION, SOLUTION INTRAMUSCULAR; INTRAVENOUS AS NEEDED
Status: DISCONTINUED | OUTPATIENT
Start: 2022-04-20 | End: 2022-04-20 | Stop reason: HOSPADM

## 2022-04-20 RX ORDER — SODIUM CHLORIDE, SODIUM LACTATE, POTASSIUM CHLORIDE, CALCIUM CHLORIDE 600; 310; 30; 20 MG/100ML; MG/100ML; MG/100ML; MG/100ML
1000 INJECTION, SOLUTION INTRAVENOUS CONTINUOUS
Status: DISCONTINUED | OUTPATIENT
Start: 2022-04-20 | End: 2022-04-20 | Stop reason: HOSPADM

## 2022-04-20 RX ORDER — FENTANYL CITRATE 50 UG/ML
50 INJECTION, SOLUTION INTRAMUSCULAR; INTRAVENOUS AS NEEDED
Status: DISCONTINUED | OUTPATIENT
Start: 2022-04-20 | End: 2022-04-20 | Stop reason: HOSPADM

## 2022-04-20 RX ORDER — GLYCOPYRROLATE 0.2 MG/ML
INJECTION INTRAMUSCULAR; INTRAVENOUS AS NEEDED
Status: DISCONTINUED | OUTPATIENT
Start: 2022-04-20 | End: 2022-04-20 | Stop reason: HOSPADM

## 2022-04-20 RX ORDER — FENTANYL CITRATE 50 UG/ML
25 INJECTION, SOLUTION INTRAMUSCULAR; INTRAVENOUS
Status: DISCONTINUED | OUTPATIENT
Start: 2022-04-20 | End: 2022-04-20 | Stop reason: HOSPADM

## 2022-04-20 RX ORDER — ACETAMINOPHEN 325 MG/1
650 TABLET ORAL ONCE
Status: COMPLETED | OUTPATIENT
Start: 2022-04-20 | End: 2022-04-20

## 2022-04-20 RX ORDER — ALBUTEROL SULFATE 0.83 MG/ML
2.5 SOLUTION RESPIRATORY (INHALATION) AS NEEDED
Status: DISCONTINUED | OUTPATIENT
Start: 2022-04-20 | End: 2022-04-20 | Stop reason: HOSPADM

## 2022-04-20 RX ORDER — SODIUM CHLORIDE, SODIUM LACTATE, POTASSIUM CHLORIDE, CALCIUM CHLORIDE 600; 310; 30; 20 MG/100ML; MG/100ML; MG/100ML; MG/100ML
INJECTION, SOLUTION INTRAVENOUS
Status: DISCONTINUED | OUTPATIENT
Start: 2022-04-20 | End: 2022-04-20

## 2022-04-20 RX ORDER — ONDANSETRON 2 MG/ML
INJECTION INTRAMUSCULAR; INTRAVENOUS AS NEEDED
Status: DISCONTINUED | OUTPATIENT
Start: 2022-04-20 | End: 2022-04-20 | Stop reason: HOSPADM

## 2022-04-20 RX ADMIN — HYDROMORPHONE HYDROCHLORIDE 0.5 MG: 2 INJECTION, SOLUTION INTRAMUSCULAR; INTRAVENOUS; SUBCUTANEOUS at 13:08

## 2022-04-20 RX ADMIN — PROPOFOL 150 MG: 10 INJECTION, EMULSION INTRAVENOUS at 11:59

## 2022-04-20 RX ADMIN — Medication 2 MG: at 12:53

## 2022-04-20 RX ADMIN — GLYCOPYRROLATE 0.4 MG: 0.2 INJECTION, SOLUTION INTRAMUSCULAR; INTRAVENOUS at 12:53

## 2022-04-20 RX ADMIN — ONDANSETRON 4 MG: 2 INJECTION INTRAMUSCULAR; INTRAVENOUS at 14:15

## 2022-04-20 RX ADMIN — ROCURONIUM BROMIDE 40 MG: 10 SOLUTION INTRAVENOUS at 11:59

## 2022-04-20 RX ADMIN — ACETAMINOPHEN 650 MG: 325 TABLET ORAL at 10:56

## 2022-04-20 RX ADMIN — LIDOCAINE HYDROCHLORIDE 100 MG: 20 INJECTION, SOLUTION EPIDURAL; INFILTRATION; INTRACAUDAL; PERINEURAL at 11:59

## 2022-04-20 RX ADMIN — FENTANYL CITRATE 25 MCG: 50 INJECTION, SOLUTION INTRAMUSCULAR; INTRAVENOUS at 13:50

## 2022-04-20 RX ADMIN — Medication 10 MG: at 12:53

## 2022-04-20 RX ADMIN — FENTANYL CITRATE 50 MCG: 50 INJECTION, SOLUTION INTRAMUSCULAR; INTRAVENOUS at 12:48

## 2022-04-20 RX ADMIN — KETOROLAC TROMETHAMINE 30 MG: 30 INJECTION, SOLUTION INTRAMUSCULAR; INTRAVENOUS at 12:53

## 2022-04-20 RX ADMIN — HYDROCODONE BITARTRATE AND ACETAMINOPHEN 1 TABLET: 5; 325 TABLET ORAL at 14:19

## 2022-04-20 RX ADMIN — SODIUM CHLORIDE, POTASSIUM CHLORIDE, SODIUM LACTATE AND CALCIUM CHLORIDE 1000 ML: 600; 310; 30; 20 INJECTION, SOLUTION INTRAVENOUS at 10:59

## 2022-04-20 RX ADMIN — ONDANSETRON HYDROCHLORIDE 4 MG: 2 INJECTION, SOLUTION INTRAMUSCULAR; INTRAVENOUS at 12:04

## 2022-04-20 RX ADMIN — DEXAMETHASONE SODIUM PHOSPHATE 4 MG: 4 INJECTION, SOLUTION INTRAMUSCULAR; INTRAVENOUS at 12:04

## 2022-04-20 RX ADMIN — FENTANYL CITRATE 50 MCG: 50 INJECTION, SOLUTION INTRAMUSCULAR; INTRAVENOUS at 11:52

## 2022-04-20 RX ADMIN — MIDAZOLAM 2 MG: 1 INJECTION INTRAMUSCULAR; INTRAVENOUS at 11:52

## 2022-04-20 RX ADMIN — HYDROMORPHONE HYDROCHLORIDE 0.5 MG: 2 INJECTION, SOLUTION INTRAMUSCULAR; INTRAVENOUS; SUBCUTANEOUS at 13:01

## 2022-04-20 RX ADMIN — FENTANYL CITRATE 25 MCG: 50 INJECTION, SOLUTION INTRAMUSCULAR; INTRAVENOUS at 14:00

## 2022-04-20 RX ADMIN — Medication 40 MG: at 11:59

## 2022-04-20 NOTE — DISCHARGE INSTRUCTIONS
[facility] [address] AT: 4:20 pm     Laparoscopy: What to Expect at [address] [place] [address]  After laparoscopic surgery, you are likely to have pain for the next several days. You may have a low fever and feel tired and sick to your stomach. This is common. You should feel better after 1 to 2 weeks. This care sheet gives you a general idea about how long it will take for you to recover. But each person recovers at a different pace. Follow the steps below to get better as quickly as possible. How can you care for yourself at home? Activity    · Rest when you feel tired. Getting enough sleep will help you recover.     · Try to walk each day. Start by walking a little more than you did the day before. Bit by bit, increase the amount you walk. Walking boosts blood flow and helps prevent pneumonia and constipation.     · Avoid strenuous activities, such as bicycle riding, jogging, weight lifting, or aerobic exercise, until your doctor says it is okay.     · Avoid lifting anything that would make you strain. This may include a child, heavy grocery bags and milk containers, a heavy briefcase or backpack, cat litter or dog food bags, or a vacuum .     · You may also have some shoulder or back pain. This pain is caused by the gas your doctor used to inflate your belly to help see your organs better. The pain usually lasts about 1 or 2 days.     · You may drive when you are no longer taking pain medicine and can quickly move your foot from the gas pedal to the brake. You must also be able to sit comfortably for a long period of time, even if you do not plan to go far. You might get caught in traffic.     · You may need to take a few days to a few weeks off work. It depends on the type of work you do and how you feel.     · You may shower 24 to 48 hours after surgery, if your doctor okays it. Pat the cut (incision) dry. Do not take a bath for the first 2 weeks, or until your doctor tells you it is okay.    Diet    · If your stomach is upset, try bland, low-fat foods such as plain rice, broiled chicken, toast, and yogurt.     · Drink plenty of fluids to prevent dehydration. Choose water and other clear liquids. If you have kidney, heart, or liver disease and have to limit fluids, talk with your doctor before you increase the amount of fluids you drink.     · You may notice that your bowel movements are not regular right after your surgery. This is common. Avoid constipation and straining with bowel movements. You may want to take a fiber supplement every day. If you have not had a bowel movement after a couple of days, ask your doctor about taking a mild laxative. Medicines    · Your doctor will tell you if and when you can restart your medicines. You will also get instructions about taking any new medicines.     · If you take aspirin or some other blood thinner, ask your doctor if and when to start taking it again. Make sure that you understand exactly what your doctor wants you to do.     · Take pain medicines exactly as directed. ? If the doctor gave you a prescription medicine for pain, take it as prescribed. ? If you are not taking a prescription pain medicine, ask your doctor if you can take an over-the-counter medicine.     · If your doctor prescribed antibiotics, take them as directed. Do not stop taking them just because you feel better. You need to take the full course of antibiotics.     · If you think your pain medicine is making you sick to your stomach:  ? Take your medicine after meals (unless your doctor has told you not to). ? Ask your doctor for a different pain medicine. Incision care    · If you have strips of tape on the incision, leave the tape on for a week or until it falls off.     · Wash the area daily with warm, soapy water and pat it dry. Don't use hydrogen peroxide or alcohol, which can slow healing. You may cover the area with a gauze bandage if it weeps or rubs against clothing.  Change the bandage every day. Follow-up care is a key part of your treatment and safety. Be sure to make and go to all appointments, and call your doctor if you are having problems. It's also a good idea to know your test results and keep a list of the medicines you take. When should you call for help? Call 911 anytime you think you may need emergency care. For example, call if:    · You passed out (lost consciousness).     · You are short of breath. Call your doctor now or seek immediate medical care if:    · You have pain that does not get better after you take pain medicine.     · You have loose stitches, or your incision comes open.     · Bright red blood has soaked through your bandage.     · You have signs of infection, such as:  ? Increased pain, swelling, warmth, or redness. ? Red streaks leading from the incision. ? Pus draining from the incision. ? A fever.     · You are sick to your stomach or cannot keep fluids down.     · You have signs of a blood clot in your leg (called a deep vein thrombosis), such as:  ? Pain in your calf, back of the knee, thigh, or groin. ? Redness and swelling in your leg or groin.     · You cannot pass stools or gas. Watch closely for any changes in your health, and be sure to contact your doctor if you have any problems. Where can you learn more? Go to http://www.gray.com/  Enter G657 in the search box to learn more about \"Laparoscopy: What to Expect at Home. \"  Current as of: September 8, 2021               Content Version: 13.2  © 2006-2022 [employer]. Care instructions adapted under license by [employer] (which disclaims liability or warranty for this information). If you have questions about a medical condition or this instruction, always ask your healthcare professional. [address] any warranty or liability for your use of this information. ______________________________________________________________________    Anesthesia Discharge Instructions    After general anesthesia or intervenous sedation, for 24 hours or while taking prescription Narcotics:  · Limit your activities  · Do not drive or operate hazardous machinery  · If you have not urinated within 8 hours after discharge, please contact your surgeon on call. · Do not make important personal or business decisions  · Do not drink alcoholic beverages    Report the following to your surgeon:  · Excessive pain, swelling, redness or odor of or around the surgical area  · Temperature over 100.5 degrees  · Nausea and vomiting lasting longer than 4 hours or if unable to take medication  · Any signs of decreased circulation or nerve impairment to extremity:  Change in color, persistent numbness, tingling, coldness or increased pain.   · Any questions

## 2022-04-20 NOTE — ANESTHESIA POSTPROCEDURE EVALUATION
Procedure(s):  DAVINCI LAPAROSOCPIC RIGHT OVARIAN CYSTECTOMY. LYSIS OF ADHESIONS, D&C.    general    Anesthesia Post Evaluation      Multimodal analgesia: multimodal analgesia used between 6 hours prior to anesthesia start to PACU discharge  Patient location during evaluation: PACU  Patient participation: complete - patient participated  Level of consciousness: awake  Pain score: 2  Pain management: adequate  Airway patency: patent  Anesthetic complications: no  Cardiovascular status: acceptable  Respiratory status: acceptable  Hydration status: acceptable  Comments: I have evaluated the patient and meets criteria for discharge from PACU. Zulema Ulloa MD  Post anesthesia nausea and vomiting:  controlled  Final Post Anesthesia Temperature Assessment:  Normothermia (36.0-37.5 degrees C)      INITIAL Post-op Vital signs:   Vitals Value Taken Time   /88 04/20/22 1335   Temp     Pulse 66 04/20/22 1337   Resp 17 04/20/22 1337   SpO2 97 % 04/20/22 1337   Vitals shown include unvalidated device data.

## 2022-04-20 NOTE — H&P
Gynecology History and Physical    Name: Rosemary Herman MRN: [id number] SSN: [id number]    YOB: 1980  Age: 40 y.o. Sex: female       Subjective:      Chief complaint:  Ovarian cyst - recurrent    Paris Mei is a 40 y.o.  female with a history of abnormal uterine bleeding. Previous workup included Ultrasound which ovarian cyst and thick endometrium. Previous treatment measures included nonsteroidal anti-inflammatory drugs (NSAID's). She is admitted for Procedure(s) (LRB):  DAVINCI LAPAROSOCPIC RIGHT OVARIAN CYSTECTOMY (Right). The current method of family planning is none. OB History        1    Para   1    Term   1            AB        Living           SAB        IAB        Ectopic        Molar        Multiple        Live Births   1          Obstetric Comments   Menarche:  9. LMP: 15. # of Children:  1. Age at Delivery of First Child:  30.   Hysterectomy/oophorectomy:  NO/NO. Breast Bx:  no.  Hx of Breast Feeding:  yes. BCP:  yes. Hormone therapy:  no.           Past Medical History:   Diagnosis Date    Allergic rhinitis     Arrhythmia     H/O WPW    Chronic pain     ENDOMETRIOSIS PAIN    Endometriosis     Hypertension     Ovarian cyst 2022    RIGHT     Past Surgical History:   Procedure Laterality Date    HX ABDOMINAL LAPAROSCOPY      HX GYN      endometriosis, LAP.    HX GYN      LAPAROTOMY - EXC. CYST    HX HEENT      WISDOM TEETH, ORAL SURGERY. Social History     Occupational History    Not on file   Tobacco Use    Smoking status: Never Smoker    Smokeless tobacco: Never Used    Tobacco comment: TRIED SMOKING YEARS AGO, TEENAGER. Vaping Use    Vaping Use: Never used   Substance and Sexual Activity    Alcohol use:  Yes     Alcohol/week: 0.0 standard drinks     Comment: 1 DRINK PER MONTH    Drug use: No    Sexual activity: Yes     Partners: Male     Birth control/protection: Inserts     Family History   Problem Relation Age of Onset    Cancer Other         breast cancer, 42's    Hypertension Mother     Thyroid Disease Mother     Diabetes Mother     No Known Problems Father     No Known Problems Sister     No Known Problems Brother     Cancer Maternal Grandfather 67        Prostate, colon    Anesth Problems Neg Hx         Allergies   Allergen Reactions    [place] Shortness of Breath    Apple Shortness of Breath     SOB, SWELLING THROAT    Carrot Shortness of Breath    Celery Shortness of Breath    Hazelnut Shortness of Breath    Sour Cherry Shortness of Breath     Prior to Admission medications    Medication Sig Start Date End Date Taking? Authorizing Provider   amLODIPine (NORVASC) 5 mg tablet Take 2.5 mg by mouth daily. Yes Provider, Historical   multivitamin (ONE A DAY) tablet Take 1 Tablet by mouth daily. Yes Provider, Historical   cetirizine (ZYRTEC) 10 mg tablet Take 10 mg by mouth daily. Yes Provider, Historical   naproxen (NAPROSYN) 500 mg tablet Take 1 Tab by mouth every twelve (12) hours as needed for Pain. 10/30/19   Carol Patton MD        Review of Systems:  A comprehensive review of systems was negative except for that written in the History of Present Illness. Objective:     Vitals:    04/20/22 1047   BP: (!) 116/98   Pulse: 76   Resp: 18   Temp: 98.3 °F (36.8 °C)   SpO2: 99%   Weight: 88.5 kg (195 lb)   Height: 5' 3\" (1.6 m)       Physical Exam:  Deferred    Assessment:     Active Problems:    * No active hospital problems. *     Ovarian cyst - recurrent with thickened endometrium    Plan:     Procedure(s) (LRB):  DAVINCI LAPAROSOCPIC RIGHT OVARIAN CYSTECTOMY (Right)  Discussed the risks of surgery including the risks of bleeding, infection, deep vein thrombosis, and surgical injuries to internal organs including but not limited to the bowels, bladder, rectum, and female reproductive organs.  The patient understands the risks; any and all questions were answered to the patient's satisfaction.

## 2022-04-21 NOTE — OP NOTES
[address] [facility]  OPERATIVE REPORT    Name:  Alvino Burnett  MR#:  [id number]  :  1980  ACCOUNT #:  [de-identified]  DATE OF SERVICE:  2022    PREOPERATIVE DIAGNOSES:  Pelvic pain, right ovarian cyst.    POSTOPERATIVE DIAGNOSES:  Pelvic pain, right ovarian cyst.    PROCEDURES PERFORMED:  Dilation and curettage, da Magali laparoscopic lysis of adhesions, right ovarian cystectomy. SURGEON:  MD Gema Armijo, hospital assistant    ANESTHESIA:  General endotracheal.    COMPLICATIONS:  None. SPECIMENS REMOVED:  Right ovarian cyst wall, endometrial curettings. IMPLANTS:  None. ESTIMATED BLOOD LOSS:  For the entire procedure, 30 mL. FINDINGS:  With laparoscopy, there was a large fluid filled cyst arising from the right ovary and adherent to the fundus of the uterus as well as the rectosigmoid. The left tube and ovary were adherent to the posterior surface of the uterus as well as the rectosigmoid. Between the rectosigmoid and the posterior uterus, there was a dark brown fluids filled cyst consistent with an endometrioma. Bilateral fallopian tubes visualized, but somewhat edematous. There was a small portion of right ovary remaining after cystectomy. The left ovary was present, but severely adherent to the surrounding structures. PROCEDURE:  The patient was taken to the operating room, placed on the operating room table. After adequate anesthesia was obtained, she was placed in the dorsal lithotomy position. The abdomen, perineum, and vagina were prepped and the patient was draped in the usual sterile fashion. A Martins catheter was placed. A sterile speculum was placed in the vagina and a single-tooth tenaculum was placed in the anterior lip of the cervix. The cervix was dilated and a smooth curette was introduced, a large amount of tissue was obtained with curettage and the use of polyp forceps.   When this was complete, all instruments were removed from the vagina and a sponge stick was left in the vagina. A small incision was made at the umbilicus with a knife. A Veress needle was inserted. The abdomen was insufflated to approximately 2 L of CO2 gas. The Veress needle was removed and an 8-mm bladeless da Magali port was introduced. Laparoscopy verified successful entry. Bilateral 8-mm da Magali ports were placed under direct visualization. The patient was placed in the steep Trendelenburg. The robot was docked. Instruments were placed and attention was turned to the right ovarian cyst that was adherent to the fundus of the uterus. This was removed using hot ana and cautery. A 12-mm port was placed in the right lower quadrant under direct visualization. The ovarian cyst was removed through this port. Attention was then turned to the adhesions between the left adnexa, rectosigmoid, and posterior surface of the uterus. These were released using sharp and blunt dissection. A chocolate cyst was revealed, this area was irrigated and suctioned. Bleeders were controlled using bipolar cautery. When this was complete, the pelvis was irrigated and suctioned. Hemostatic powder was placed over all areas to assure hemostasis. Instruments were removed. The robot was removed from the patient. The abdomen was desufflated and all trocars were removed. Skin incisions were closed using subcuticular sutures of 4-0 Monocryl and Dermabond. The right lower quadrant 12 port was closed at the fascia using an Endo Close with 0 Vicryl. There was clear urine draining from the Martins at the end of the procedure. All sponge, needle, and instrument counts were correct x2 at the end of the procedure. The patient tolerated the procedure well, was taken to the postanesthesia care unit in satisfactory condition.       MD TRICIA [email or web address]  D:  04/20/2022 13:14  T:  04/20/2022 23:31  JOB #:  [id number]

## 2022-07-28 ENCOUNTER — TRANSCRIBE ORDER (OUTPATIENT)
Dept: SCHEDULING | Age: 42
End: 2022-07-28

## 2022-07-28 DIAGNOSIS — Z12.31 VISIT FOR SCREENING MAMMOGRAM: Primary | ICD-10-CM

## 2022-10-19 ENCOUNTER — HOSPITAL ENCOUNTER (OUTPATIENT)
Dept: MAMMOGRAPHY | Age: 42
Discharge: HOME | End: 2022-10-19
Attending: SPECIALIST
Payer: COMMERCIAL

## 2022-10-19 ENCOUNTER — TRANSCRIBE ORDER (OUTPATIENT)
Dept: SCHEDULING | Age: 42
End: 2022-10-19

## 2022-10-19 DIAGNOSIS — Z12.31 VISIT FOR SCREENING MAMMOGRAM: ICD-10-CM

## 2022-10-19 DIAGNOSIS — Z12.31 VISIT FOR SCREENING MAMMOGRAM: Primary | ICD-10-CM

## 2022-10-19 PROCEDURE — 77067 SCR MAMMO BI INCL CAD: CPT

## 2022-11-21 ENCOUNTER — OFFICE VISIT (OUTPATIENT)
Dept: NEUROLOGY | Age: 42
End: 2022-11-21
Payer: COMMERCIAL

## 2022-11-21 VITALS
HEIGHT: 63 IN | WEIGHT: 196 LBS | OXYGEN SATURATION: 98 % | DIASTOLIC BLOOD PRESSURE: 68 MMHG | SYSTOLIC BLOOD PRESSURE: 120 MMHG | BODY MASS INDEX: 34.73 KG/M2 | HEART RATE: 84 BPM

## 2022-11-21 DIAGNOSIS — G31.84 MCI (MILD COGNITIVE IMPAIRMENT): Primary | ICD-10-CM

## 2022-11-21 PROCEDURE — 99204 OFFICE O/P NEW MOD 45 MIN: CPT | Performed by: PSYCHIATRY & NEUROLOGY

## 2022-11-21 NOTE — LETTER
11/22/2022    Patient: Tanja Raymundo   YOB: 1980   Date of Visit: 11/21/2022     Cay Hodgkins, NP  [address] [facility] [address]  Via Fax: [phone]    Dear Cay Hodgkins, NP,      Thank you for referring Ms. Valdeamr Longoria to [address] [place] [address] for evaluation. My notes for this consultation are attached. If you have questions, please do not hesitate to call me. I look forward to following your patient along with you.       Sincerely,    Rogelio Garcia, DO

## 2022-11-21 NOTE — PROGRESS NOTES
NEUROLOGY  NEW PATIENT EVALUATION/CONSULTATION       PATIENT NAME: Horacio Lizama    MRN: [id number]    REASON FOR CONSULTATION: Memory impairment    11/21/22      Previous records (physician notes, laboratory reports, and radiology reports) and imaging studies were reviewed and summarized. My recommendations will be communicated back to the patient's physician(s) via electronic medical record and/or by [address] [facility] [facility] mail. HISTORY OF PRESENT ILLNESS:  Horacio Lizama is a 40 y.o.  female presenting for evaluation of memory impairment. She reports onset of symptoms >20 years ago. Onset and progression: Progressive over several years    Neuropsychiatric symptoms     Problems with judgment: No   Reduced interest in hobbies/activities: No   Repeats questions, stories, or statements: No    Trouble recalling people's names: Yes   Trouble learning how to use a tool or appliance: No   Forgetting the correct month or year: No   Difficulty handling financial affairs (bill-paying, taxes): No   Difficulty remembering appointments:No    Memory: Short term recall deficits  Language: Word finding difficulty, stuttering  Change in personality: None  Change in eating habits:None  Physical changes:None  Depressive symptoms:Denies  Hallucinations/Delusions:Denies    Ability to function:  Driving:Yes, no difficulty with navigation  Finances:Handles independently without difficulty  Manages own medication: Yes, some difficulty recalling to take medication  Residing: Lives with her daughter    Prior work-up: Neurology evaluation (Dr. Morris Garcia), MRI Brain NAP 2009, MRI Brain [facility] 12/2015 \"patent cavum septum vergae which reflects normal anatomic variation. MR evaluation of the brain is within normal limits:  MRI Brain [facility] 3/2017 \"negative brain MRI, small suboccipital lymph node, within normal limit in size, right scalp soft tissue nodular lesion measuring 13 x 6mm\".      Neuropsychologic testing \"years ago\"-normal per patient    Prior treatments: None    PAST MEDICAL HISTORY:  Past Medical History:   Diagnosis Date    Allergic rhinitis     Arrhythmia     H/O WPW    Chronic pain     ENDOMETRIOSIS PAIN    Endometriosis     Hypertension     Ovarian cyst 04/2022    RIGHT    Meena-Parkinson-White syndrome        PAST SURGICAL HISTORY:  Past Surgical History:   Procedure Laterality Date    HX ABDOMINAL LAPAROSCOPY      HX GYN      endometriosis, LAP. HX GYN  2000    LAPAROTOMY - EXC. CYST    HX HEENT      WISDOM TEETH, ORAL SURGERY. FAMILY HISTORY:   Family History   Problem Relation Age of Onset    Cancer Other         breast cancer, 42's    Hypertension Mother     Thyroid Disease Mother     Diabetes Mother     No Known Problems Father     No Known Problems Sister     No Known Problems Brother     Cancer Maternal Grandfather 67        Prostate, colon    Anesth Problems Neg Hx          SOCIAL HISTORY:  Social History     Socioeconomic History    Marital status: SINGLE   Tobacco Use    Smoking status: Never    Smokeless tobacco: Never    Tobacco comments:     TRIED SMOKING YEARS AGO, TEENAGER. Vaping Use    Vaping Use: Never used   Substance and Sexual Activity    Alcohol use: Yes     Comment: socially    Drug use: No    Sexual activity: Yes     Partners: Male     Birth control/protection: Inserts         MEDICATIONS:   Current Outpatient Medications   Medication Sig Dispense Refill    amLODIPine (NORVASC) 5 mg tablet Take 2.5 mg by mouth daily. multivitamin (ONE A DAY) tablet Take 1 Tablet by mouth daily. naproxen (NAPROSYN) 500 mg tablet Take 1 Tab by mouth every twelve (12) hours as needed for Pain. 20 Tab 0    cetirizine (ZYRTEC) 10 mg tablet Take 10 mg by mouth daily as needed.            ALLERGIES:  Allergies   Allergen Reactions    [place] Shortness of Breath    Apple Shortness of Breath     SOB, SWELLING THROAT    Carrot Shortness of Breath    Celery Shortness of Breath    Hazelnut Shortness of Breath    Sour Denis Shortness of Breath         REVIEW OF SYSTEMS:  10 point ROS reviewed with patient. Please see scanned document under media. PHYSICAL EXAM:  Vital Signs: Visit Vitals  /68   Pulse 84   Ht 5' 3\" (1.6 m)   Wt 196 lb (88.9 kg)   SpO2 98%   BMI 34.72 kg/m²        General Medical Exam:  General:  Well appearing, comfortable, in no apparent distress. Eyes/ENT: see cranial nerve examination. Neck: No masses appreciated. Full range of motion without tenderness. Respiratory:  Clear to auscultation, good air entry bilaterally. Cardiac:  Regular rate and rhythm, no murmur. GI:  Soft, non-tender, non-distended abdomen. Bowel sounds normal. No masses, organomegaly. Extremities:  No deformities, edema, or skin discoloration. Skin:  No rashes or lesions. Neurological:  Mental Status:  Alert and oriented to person, place, and time with fluent speech. MOCA: 25/30 (see scanned media)  Cranial Nerves:   CNII/III/IV/VI: visual fields full to confrontation, EOMI, PERRL, no ptosis or nystagmus. CN V: Facial sensation intact bilaterally, masseter 5/5   CN VII: Facial muscles symmetric and strong   CN VIII: Hears finger rub well bilaterally, intact vestibular function   CN IX/X: Normal palatal movement   CN XI: Full strength shoulder shrug bilaterally   CN XII: Tongue protrusion full and midline without fasciculation or atrophy  Motor: Normal tone and muscle bulk with no pronator drift. No atrophy or fasciculations present on examination.   Individual muscle group testing:  Shoulder abduction:   Left:5/5   Right : 5/5    Shoulder adduction:   Left:5/5   Right : 5/5    Elbow flexion:      Left:5/5   Right : 5/5  Elbow extension:    Left:5/5   Right : 5/5   Wrist flexion:    Left:5/5   Right : 5/5  Wrist extension:    Left:5/5   Right : 5/5  Arm pronation:   Left:5/5   Right : 5/5  Arm supination:   Left:5/5   Right : 5/5    Finger flexion:    Left:5/5   Right : 5/5    Finger extension:   Left:5/5   Right : 5/5   Finger abduction:  Left:5/5   Right : 5/5   Finger adduction:   Left:5/5   Right : 5/5  Hip flexion:     Left:5/5   Right : 5/5         Hip extension:   Left:5/5   Right : 5/5    Knee flexion:    Left:5/5   Right : 5/5    Knee extension:   Left:5/5   Right : 5/5    Dorsiflexion:     Left:5/5   Right : 5/5  Plantar flexion:    Left:5/5   Right : 5/5      MSRs: No crossed adductors or clonus. RIGHT  LEFT   Brachioradialis 2+ 2+   Biceps 2+ 2+   Triceps 2+ 2+   Knee 2+ 2+   Achilles 2+ 2+        Plantar response Downward Downward          Sensation: Normal and symmetric perception of temperature, light touch, proprioception, and vibration; Coordination: No dysmetria. Normal rapid alternating movements; finger-to-nose and heel-to- shin testing are within normal limits. Gait: Normal native gait. PERTINENT DATA:  INTERNAL RECORDS:  The patient's electronic medical record was reviewed. The relevant details include:    MRI Results (maximum last 3): Results from [place] encounter on 05/26/09    MRI BRAIN W AND W/O CONT    Narrative  Final Report          ICD Codes / Adm. Diagnosis:    /   INTRACTABLE HEADACHES, CEREBRO  Examination:  BRAIN W  AND WO CON  - [id number] - May 26 2009  7:28AM    Accession No:  [id number]  Reason:  INTRACTABLE HEADACHES, CEREBRO      REPORT:  CLINICAL INDICATION: Memory loss, headaches. Technical factors: Sagittal T1-weighted, diffusion imaging, axial  T1-weighted pre- and post-16 cc IV Magnevist T2-weighted FLAIR gradient-echo  coronal T1-weighted postcontrast.    Comparison to 2007 examination. The ventricles are normal in size and in the midline. There is no abnormal  signal intensity enhancing lesion shift mass effect or hemorrhage. IMPRESSION: Negative and no change.         Interpreting/Reading Doctor: Jr Santillan ([id number])  Transcribed: n/a on 05/26/2009  Approved: Jr Santillan ([id number])  05/26/2009        Distribution:  Attending Doctor: Maciej Holliday FELIPE  Alternate Doctor: Karon Heard        ASSESSMENT:      ICD-10-CM ICD-9-CM    1. MCI (mild cognitive impairment)  G31.84 331.83 VITAMIN B12      TSH 3RD GENERATION      REFERRAL TO NEUROPSYCHOLOGY      40year old AAF presenting for evaluation of long-standing cognitive complaints for over 20 years involving short term recall and word finding issues. She has completed multiple Neurologic evaluations in the past both locally (Dr. Tere Boyd) and at [facility] in 2015 with MRI Brain x 3 last performed 3/2017 [facility] and without acute intracranial pathology, noted cavum septum vergae reflecting a normal anatomic variation, small suboccipital lymph node within normal limit in size as well as incidental right scalp soft tissue nodular lesion for which she was previously referred to cosmetic surgery. She has also completed Neuropsychologic testing several years ago which was also reportedly normal per patient, although I do not have these records for review. MOCA is 25/30 today with borderline mild cognitive impairment. We will proceed with updated neuropsychologic testing due to perceived worsening symptoms over two decades for further evaluation. I have also advised that she complete laboratory investigations to exclude any contributing metabolic derangements. Will defer additional neuroimaging at this time given she has repeated this three times previously without significant contributing intracranial abnormality. PLAN:  Labs: B12, TSH  Neuropsychologic testing  Heart healthy diet and exercise  Regular scheduled cognitive and social engagement. Follow-up and Dispositions    Return in about 4 months (around 3/21/2023). I have discussed the diagnosis with the patient today and the intended plan as seen in the above orders with both the patient as well as referring provider and/or PCP via electronic correspondence.  The patient has received an after-visit summary and questions were answered concerning future plans. Yesenia Noriega,   Staff Neurologist  Diplomate, American Board of Psychiatry & Neurology       CC Referring provider:    Yun Jha NP

## 2022-11-22 LAB
TSH SERPL DL<=0.005 MIU/L-ACNC: 1.35 UIU/ML (ref 0.45–4.5)
VIT B12 SERPL-MCNC: 624 PG/ML (ref 232–1245)

## 2022-11-23 ENCOUNTER — DOCUMENTATION ONLY (OUTPATIENT)
Dept: NEUROLOGY | Age: 42
End: 2022-11-23

## 2022-11-25 ENCOUNTER — TELEPHONE (OUTPATIENT)
Dept: NEUROLOGY | Age: 42
End: 2022-11-25

## 2022-11-25 NOTE — TELEPHONE ENCOUNTER
Attempted to call the Pt. However could not leave a message per Dr. Paulson Offer:  Kd Ortega reviewed without significant abnormalities.   Sent a message on Pt's my chart as well

## 2022-11-28 ENCOUNTER — TELEPHONE (OUTPATIENT)
Dept: NEUROLOGY | Age: 42
End: 2022-11-28

## 2022-11-28 NOTE — TELEPHONE ENCOUNTER
Pt has referral from Dr. Dee Dee Nelson to see Dr. Joey Clayton. Please call to schedule.  [phone]

## 2022-11-30 ENCOUNTER — TELEPHONE (OUTPATIENT)
Dept: NEUROLOGY | Age: 42
End: 2022-11-30

## 2022-11-30 NOTE — TELEPHONE ENCOUNTER
Called and spoke to the Pt per Dr. Flavia Aaron:    Please let patient know that outside records were reviewed. She had a third MRI Brain WWO performed 3/26/17 again without any intracranial pathology that would be contributing to her cognitive symptoms, noted incidental small suboccipital lymph node within normal limits for size per report as well as incidental right scalp soft tissue lesion (just under the skin).  If she is concerned regarding the soft tissue lesion, this would require cosmetic surgery/dermatology follow up

## 2022-12-14 ENCOUNTER — VIRTUAL VISIT (OUTPATIENT)
Dept: NEUROLOGY | Age: 42
End: 2022-12-14
Payer: COMMERCIAL

## 2022-12-14 DIAGNOSIS — R41.3 MEMORY LOSS: Primary | ICD-10-CM

## 2022-12-14 DIAGNOSIS — R45.89 SYMPTOMS OF DEPRESSION: ICD-10-CM

## 2022-12-14 DIAGNOSIS — Z91.49 HISTORY OF PSYCHOLOGICAL TRAUMA: ICD-10-CM

## 2022-12-14 DIAGNOSIS — F41.8 ANXIETY ABOUT HEALTH: ICD-10-CM
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
## 2023-01-16 ENCOUNTER — TELEPHONE (OUTPATIENT)
Dept: NEUROLOGY | Age: 43
End: 2023-01-16

## 2023-01-16 NOTE — TELEPHONE ENCOUNTER
Per [employer] no PA is needed for [id number], [id number], [id number], [id number], [id number], [id number].     REF# [id number]

## 2023-01-30 ENCOUNTER — TELEPHONE (OUTPATIENT)
Dept: NEUROLOGY | Age: 43
End: 2023-01-30

## 2023-03-15 ENCOUNTER — OFFICE VISIT (OUTPATIENT)
Dept: NEUROLOGY | Age: 43
End: 2023-03-15
Payer: COMMERCIAL

## 2023-03-15 DIAGNOSIS — F43.10 PTSD (POST-TRAUMATIC STRESS DISORDER): ICD-10-CM

## 2023-03-15 DIAGNOSIS — Z65.8 INADEQUATE SOCIAL SUPPORT: ICD-10-CM

## 2023-03-15 DIAGNOSIS — F43.23 ADJUSTMENT DISORDER WITH MIXED ANXIETY AND DEPRESSED MOOD: ICD-10-CM

## 2023-03-15 DIAGNOSIS — F90.2 ATTENTION DEFICIT HYPERACTIVITY DISORDER (ADHD), COMBINED TYPE: Primary | ICD-10-CM

## 2023-03-15 PROCEDURE — 96139 PSYCL/NRPSYC TST TECH EA: CPT | Performed by: PSYCHOLOGIST

## 2023-03-15 PROCEDURE — 96136 PSYCL/NRPSYC TST PHY/QHP 1ST: CPT | Performed by: PSYCHOLOGIST

## 2023-03-15 PROCEDURE — 96132 NRPSYC TST EVAL PHYS/QHP 1ST: CPT | Performed by: PSYCHOLOGIST

## 2023-03-15 PROCEDURE — 96133 NRPSYC TST EVAL PHYS/QHP EA: CPT | Performed by: PSYCHOLOGIST

## 2023-03-15 PROCEDURE — 96137 PSYCL/NRPSYC TST PHY/QHP EA: CPT | Performed by: PSYCHOLOGIST

## 2023-03-15 PROCEDURE — 96138 PSYCL/NRPSYC TECH 1ST: CPT | Performed by: PSYCHOLOGIST

## 2023-03-16 NOTE — PROGRESS NOTES
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 writing etcetera were initiated after the patient finished testing/raw data collected and was completed on the date the report was signed. This note will not be viewable in [address] This note was created using voice recognition software. Despite editing, there may be syntax errors.

## 2023-03-28 ENCOUNTER — TELEPHONE (OUTPATIENT)
Dept: NEUROLOGY | Age: 43
End: 2023-03-28

## 2023-04-06 NOTE — TELEPHONE ENCOUNTER
Returned call and patient stated she would just wait and get the results from her testing from Dr Nicole Beyer.

## 2023-04-21 DIAGNOSIS — Z12.31 VISIT FOR SCREENING MAMMOGRAM: Primary | ICD-10-CM

## 2023-04-22 ENCOUNTER — TRANSCRIBE ORDERS (OUTPATIENT)
Facility: HOSPITAL | Age: 43
End: 2023-04-22

## 2023-04-22 DIAGNOSIS — Z12.31 VISIT FOR SCREENING MAMMOGRAM: Primary | ICD-10-CM

## 2023-05-30 ENCOUNTER — CLINICAL DOCUMENTATION (OUTPATIENT)
Age: 43
End: 2023-05-30

## 2023-05-30 ENCOUNTER — OFFICE VISIT (OUTPATIENT)
Age: 43
End: 2023-05-30
Payer: COMMERCIAL

## 2023-05-30 VITALS
BODY MASS INDEX: 33.66 KG/M2 | OXYGEN SATURATION: 98 % | SYSTOLIC BLOOD PRESSURE: 122 MMHG | HEIGHT: 63 IN | WEIGHT: 190 LBS | HEART RATE: 91 BPM | DIASTOLIC BLOOD PRESSURE: 88 MMHG

## 2023-05-30 DIAGNOSIS — F43.23 ADJUSTMENT DISORDER WITH MIXED ANXIETY AND DEPRESSED MOOD: ICD-10-CM

## 2023-05-30 DIAGNOSIS — F90.2 ATTENTION-DEFICIT HYPERACTIVITY DISORDER, COMBINED TYPE: Primary | ICD-10-CM

## 2023-05-30 DIAGNOSIS — F43.10 POST-TRAUMATIC STRESS DISORDER, UNSPECIFIED: ICD-10-CM

## 2023-05-30 PROCEDURE — 99214 OFFICE O/P EST MOD 30 MIN: CPT | Performed by: PSYCHIATRY & NEUROLOGY

## 2023-05-30 NOTE — PROGRESS NOTES
Neurology Clinic Follow up Note    Patient ID:  Joe Ramesh  [id number]  38 y.o.  1980      Ms. Antoinette Ram is here for follow up today of  Chief Complaint   Patient presents with    Other     Pt returning for H/O MCI  Pt feels things are about the same. Last Appointment With Me:  11/21/2022       Interval History:   Patient returns for follow up of perceived cognitive impairment. She denies any significant change in reported symptoms since her last visit. She is still dealing with occasional forgetfulness, saying the wrong words during conversation or in emails. Occupational duties do not appear to be significantly affected. PMHx/ PSHx/ FHx/ SHx:  Reviewed and unchanged previous visit. Past Medical History:   Diagnosis Date    Allergic rhinitis     Arrhythmia     H/O WPW    Chronic pain     ENDOMETRIOSIS PAIN    Endometriosis     Hypertension     Ovarian cyst 04/2022    RIGHT    Moses-Parkinson-White syndrome        ROS:  Comprehensive review of systems negative except for as noted above. Objective:       Meds:  Current Outpatient Medications   Medication Sig Dispense Refill    amLODIPine (NORVASC) 5 MG tablet Take 0.5 tablets by mouth daily      cetirizine (ZYRTEC) 10 MG tablet Take 1 tablet by mouth daily as needed       No current facility-administered medications for this visit. Exam:  /88   Pulse 91   Ht 5' 3\" (1.6 m)   Wt 190 lb (86.2 kg)   SpO2 98%   BMI 33.66 kg/m²   NEUROLOGICAL EXAM:  General: Awake, alert, speech fluent  CN: PERRL, EOMI without nystagmus, VFF to confrontation, facial sensation and strength are normal and symmetric, hearing is intact to finger rub bilaterally, palate and tongue movements are intact and symmetric. Motor: Normal tone, bulk and strength bilaterally. Reflexes: Deferred  Coordination: FNF, JAX, HTS intact. Sensation: LT intact throughout. Gait: Normal-based and steady.     LABS  No visits with results within 6 Month(s) from this

## 2023-10-24 ENCOUNTER — HOSPITAL ENCOUNTER (OUTPATIENT)
Facility: HOSPITAL | Age: 43
Discharge: HOME | End: 2023-10-27
Attending: SPECIALIST
Payer: COMMERCIAL

## 2023-10-24 VITALS — WEIGHT: 200 LBS | HEIGHT: 63 IN | BODY MASS INDEX: 35.44 KG/M2

## 2023-10-24 DIAGNOSIS — Z12.31 VISIT FOR SCREENING MAMMOGRAM: ICD-10-CM

## 2023-10-24 PROCEDURE — 77063 BREAST TOMOSYNTHESIS BI: CPT

## 2023-10-31 ENCOUNTER — HOSPITAL ENCOUNTER (OUTPATIENT)
Facility: HOSPITAL | Age: 43
Discharge: HOME | End: 2023-11-03
Payer: COMMERCIAL

## 2023-10-31 VITALS — BODY MASS INDEX: 35.44 KG/M2 | WEIGHT: 200 LBS | HEIGHT: 63 IN

## 2023-10-31 DIAGNOSIS — R92.8 ABNORMAL MAMMOGRAM: ICD-10-CM

## 2023-10-31 PROCEDURE — 76642 ULTRASOUND BREAST LIMITED: CPT

## 2023-10-31 PROCEDURE — G0279 TOMOSYNTHESIS, MAMMO: HCPCS

## 2023-11-13 ENCOUNTER — HOSPITAL ENCOUNTER (OUTPATIENT)
Facility: HOSPITAL | Age: 43
Discharge: HOME | End: 2023-11-16
Payer: COMMERCIAL

## 2023-11-13 DIAGNOSIS — R92.8 ABNORMAL MAMMOGRAM: ICD-10-CM

## 2023-11-13 PROCEDURE — 76642 ULTRASOUND BREAST LIMITED: CPT

## 2024-02-27 NOTE — PROGRESS NOTES
04/10/18 0815   Family Communication   Family Update Message Surgeon working   Delivery Origin Nurse    Relationship to Patient Parent  (\"Hanh\")   Family/Significant Other Update Other (Comment)  (mother returning previous call) Detail Level: Zone Detail Level: Detailed Detail Level: Simple

## 2024-10-25 ENCOUNTER — HOSPITAL ENCOUNTER (OUTPATIENT)
Facility: HOSPITAL | Age: 44
Discharge: HOME | End: 2024-10-28
Attending: SPECIALIST
Payer: COMMERCIAL

## 2024-10-25 VITALS — HEIGHT: 63 IN | BODY MASS INDEX: 35.44 KG/M2 | WEIGHT: 200 LBS

## 2024-10-25 DIAGNOSIS — Z12.31 VISIT FOR SCREENING MAMMOGRAM: ICD-10-CM

## 2024-10-25 PROCEDURE — 77063 BREAST TOMOSYNTHESIS BI: CPT

## 2025-03-18 ENCOUNTER — TRANSCRIBE ORDERS (OUTPATIENT)
Facility: HOSPITAL | Age: 45
End: 2025-03-18

## 2025-03-18 DIAGNOSIS — Z12.31 ENCOUNTER FOR SCREENING MAMMOGRAM FOR MALIGNANT NEOPLASM OF BREAST: Primary | ICD-10-CM

## (undated) DEVICE — (D)PREP SKN CHLRAPRP APPL 26ML -- CONVERT TO ITEM 371833

## (undated) DEVICE — TROCAR ENDOSCP SHFT L150MM DIA12MM BLDELSS ENDOPATH XCEL

## (undated) DEVICE — INFECTION CONTROL KIT SYS

## (undated) DEVICE — 1200 GUARD II KIT W/5MM TUBE W/O VAC TUBE: Brand: GUARDIAN

## (undated) DEVICE — STERILE POLYISOPRENE POWDER-FREE SURGICAL GLOVES WITH EMOLLIENT COATING: Brand: PROTEXIS

## (undated) DEVICE — Device

## (undated) DEVICE — SUTURE SZ 0 27IN 5/8 CIR UR-6  TAPER PT VIOLET ABSRB VICRYL J603H

## (undated) DEVICE — GLOVE ORANGE PI 7 1/2   MSG9075

## (undated) DEVICE — NEEDLE INSUF L120MM DIA2MM DISP FOR PNEUMOPERI ENDOPATH

## (undated) DEVICE — FILTER SMK EVAC FLO CLR MEGADYNE

## (undated) DEVICE — BARRIER TISS ADH ABSRB 3X4IN -- GYNECARE INTERCEED

## (undated) DEVICE — TIP COVER ACCESSORY

## (undated) DEVICE — SYR 20ML LL STRL LF --

## (undated) DEVICE — SYR 10ML LUER LOK 1/5ML GRAD --

## (undated) DEVICE — KIT DISPOSABLE ACC 4ARM ENDOWRIST DAVINCI

## (undated) DEVICE — KENDALL SCD EXPRESS SLEEVES, KNEE LENGTH, MEDIUM: Brand: KENDALL SCD

## (undated) DEVICE — TROCAR SITE CLOSURE DEVICE: Brand: ENDO CLOSE

## (undated) DEVICE — CATH FOL TY IC BAG 16FR 2000ML -- CONVERT TO ITEM 363158

## (undated) DEVICE — SPECIMEN RETRIEVAL POUCH: Brand: ENDO CATCH GOLD

## (undated) DEVICE — DERMABOND SKIN ADH 0.7ML -- DERMABOND ADVANCED 12/BX

## (undated) DEVICE — SUTURE MCRYL SZ 4-0 L27IN ABSRB UD L19MM PS-2 1/2 CIR PRIM Y426H

## (undated) DEVICE — SEAL UNIV 5-8MM DISP BX/10 -- DA VINCI XI - SNGL USE

## (undated) DEVICE — AGENT HEMSTAT 3GM PURIFIED PLNT STARCH PWD ABSRB ARISTA AH

## (undated) DEVICE — SYSTEM EVAC SMOKE LAPARSCOPIC

## (undated) DEVICE — UNIVERSAL FIXATION CANNULA: Brand: VERSAPORT

## (undated) DEVICE — BLADELESS OPTICAL TROCAR WITH FIXATION CANNULA: Brand: VERSAPORT

## (undated) DEVICE — DEVON™ KNEE AND BODY STRAP 60" X 3" (1.5 M X 7.6 CM): Brand: DEVON

## (undated) DEVICE — SUTURE VCRL SZ 0 L27IN ABSRB UD L26MM CT-2 1/2 CIR J270H

## (undated) DEVICE — APPLICATOR SURG XL L38CM FOR ARISTA ABSRB HEMSTAT FLEXITIP

## (undated) DEVICE — VISUALIZATION SYSTEM: Brand: CLEARIFY

## (undated) DEVICE — PACK,BASIC,SIRUS,V: Brand: MEDLINE

## (undated) DEVICE — ELECTRO LUBE IS A SINGLE PATIENT USE DEVICE THAT IS INTENDED TO BE USED ON ELECTROSURGICAL ELECTRODES TO REDUCE STICKING.: Brand: KEY SURGICAL ELECTRO LUBE

## (undated) DEVICE — NDL SINE QNCKE 22GAX1.5IN BLK --

## (undated) DEVICE — DRAPE,REIN 53X77,STERILE: Brand: MEDLINE

## (undated) DEVICE — SOLUTION IRRIG 1000ML 0.9% SOD CHL USP POUR PLAS BTL

## (undated) DEVICE — ARM DRAPE

## (undated) DEVICE — GYN LAPAROSCOPY - SMH: Brand: MEDLINE INDUSTRIES, INC.

## (undated) DEVICE — TRAY PREP DRY W/ PREM GLV 2 APPL 6 SPNG 2 UNDPD 1 OVERWRAP

## (undated) DEVICE — LEGGINGS: Brand: CONVERTORS

## (undated) DEVICE — INSUFFLATION NEEDLE TO ESTABLISH PNEUMOPERITONEUM.: Brand: INSUFFLATION NEEDLE

## (undated) DEVICE — OBTRTR BLDELSS 8MM DISP -- DA VINCI - SNGL USE

## (undated) DEVICE — BLADELESS OPTICAL TROCAR WITH FIXATION CANNULA: Brand: VERSAONE

## (undated) DEVICE — SEALANT KT FIBRIN HEMSTAT 4ML -- TISSEEL

## (undated) DEVICE — SOLUTION IV 1000ML 0.9% SOD CHL

## (undated) DEVICE — PAD SANIT NPKN 4IN GRD

## (undated) DEVICE — VCARE DX UTERINE MANIPULATOR/INJECTOR CANNULA: Brand: VCARE DX

## (undated) DEVICE — BLADELESS OBTURATOR

## (undated) DEVICE — SEAL CANN F/12MM 8.5-13MM DISP -- DA VINCI

## (undated) DEVICE — SUTURE VLOC 90 2/0 VL 6 GS-22 VLOCM2105

## (undated) DEVICE — TBG INSUFFLATION LUER LOCK: Brand: MEDLINE INDUSTRIES, INC.

## (undated) DEVICE — PAD PT POS 36 IN SURGYPAD DISP

## (undated) DEVICE — UNIVERSAL FIXATION CANNULA: Brand: VERSAONE

## (undated) DEVICE — BASIC PACK: Brand: CONVERTORS

## (undated) DEVICE — TROCAR ENDOSCP SHFT L100MM DIA12MM INTEGR STBL ENDOPATH

## (undated) DEVICE — FENESTRATED BIPOLAR FORCEPS: Brand: ENDOWRIST

## (undated) DEVICE — SURGICAL PROCEDURE PACK GYN LAPAROSCOPY CUST SMH LF

## (undated) DEVICE — BIPOLAR FORCEPS CORD: Brand: VALLEYLAB

## (undated) DEVICE — SET SEAL SPR W/ HD CONN TB PRSS LN FLTR DBL SYR SYS DISP

## (undated) DEVICE — SYSTEM FASCIAL CLSR UNIQUE SHLDED WNG SAFE UNIF CONSISTENT

## (undated) DEVICE — SCISSORS SURG DIA8MM MPLR CRV ENDOWRIST

## (undated) DEVICE — APPLICATOR LAP 35 CM 2 RIGID VISTASEAL